# Patient Record
Sex: FEMALE | Race: BLACK OR AFRICAN AMERICAN | Employment: UNEMPLOYED | URBAN - METROPOLITAN AREA
[De-identification: names, ages, dates, MRNs, and addresses within clinical notes are randomized per-mention and may not be internally consistent; named-entity substitution may affect disease eponyms.]

---

## 2018-10-10 ENCOUNTER — APPOINTMENT (OUTPATIENT)
Dept: CT IMAGING | Age: 68
DRG: 720 | End: 2018-10-10
Attending: EMERGENCY MEDICINE
Payer: MEDICAID

## 2018-10-10 ENCOUNTER — HOSPITAL ENCOUNTER (INPATIENT)
Age: 68
LOS: 5 days | Discharge: HOME | DRG: 720 | End: 2018-10-15
Attending: EMERGENCY MEDICINE | Admitting: HOSPITALIST
Payer: MEDICAID

## 2018-10-10 ENCOUNTER — APPOINTMENT (OUTPATIENT)
Dept: GENERAL RADIOLOGY | Age: 68
DRG: 720 | End: 2018-10-10
Attending: EMERGENCY MEDICINE
Payer: MEDICAID

## 2018-10-10 DIAGNOSIS — N39.0 ACUTE UTI: Primary | ICD-10-CM

## 2018-10-10 DIAGNOSIS — E83.42 HYPOMAGNESEMIA: ICD-10-CM

## 2018-10-10 DIAGNOSIS — A41.9 SEVERE SEPSIS (HCC): ICD-10-CM

## 2018-10-10 DIAGNOSIS — E87.6 ACUTE HYPOKALEMIA: ICD-10-CM

## 2018-10-10 DIAGNOSIS — G40.909 RECURRENT SEIZURES (HCC): ICD-10-CM

## 2018-10-10 DIAGNOSIS — E83.51 HYPOCALCEMIA: ICD-10-CM

## 2018-10-10 DIAGNOSIS — R65.20 SEVERE SEPSIS (HCC): ICD-10-CM

## 2018-10-10 PROBLEM — R56.9 SEIZURES (HCC): Status: ACTIVE | Noted: 2018-10-10

## 2018-10-10 LAB
ALBUMIN SERPL-MCNC: 2.4 G/DL (ref 3.4–5)
ALBUMIN/GLOB SERPL: 0.5 {RATIO} (ref 0.8–1.7)
ALP SERPL-CCNC: 70 U/L (ref 45–117)
ALT SERPL-CCNC: 19 U/L (ref 13–56)
AMMONIA PLAS-SCNC: 55 UMOL/L (ref 11–32)
ANION GAP SERPL CALC-SCNC: 12 MMOL/L (ref 3–18)
APPEARANCE UR: CLEAR
AST SERPL-CCNC: 21 U/L (ref 15–37)
BACTERIA URNS QL MICRO: ABNORMAL /HPF
BASOPHILS # BLD: 0 K/UL (ref 0–0.1)
BASOPHILS NFR BLD: 0 % (ref 0–2)
BILIRUB SERPL-MCNC: 0.1 MG/DL (ref 0.2–1)
BILIRUB UR QL: NEGATIVE
BUN SERPL-MCNC: 9 MG/DL (ref 7–18)
BUN/CREAT SERPL: 8 (ref 12–20)
CA-I SERPL-SCNC: 0.53 MMOL/L (ref 1.12–1.32)
CALCIUM SERPL-MCNC: 5.1 MG/DL (ref 8.5–10.1)
CHLORIDE SERPL-SCNC: 107 MMOL/L (ref 100–108)
CK MB CFR SERPL CALC: 0.2 % (ref 0–4)
CK MB SERPL-MCNC: 1.2 NG/ML (ref 5–25)
CK SERPL-CCNC: 651 U/L (ref 26–192)
CO2 SERPL-SCNC: 25 MMOL/L (ref 21–32)
COLOR UR: YELLOW
CREAT SERPL-MCNC: 1.14 MG/DL (ref 0.6–1.3)
DIFFERENTIAL METHOD BLD: ABNORMAL
EOSINOPHIL # BLD: 0 K/UL (ref 0–0.4)
EOSINOPHIL NFR BLD: 0 % (ref 0–5)
EPITH CASTS URNS QL MICRO: ABNORMAL /LPF (ref 0–5)
ERYTHROCYTE [DISTWIDTH] IN BLOOD BY AUTOMATED COUNT: 15.8 % (ref 11.6–14.5)
GLOBULIN SER CALC-MCNC: 4.9 G/DL (ref 2–4)
GLUCOSE SERPL-MCNC: 118 MG/DL (ref 74–99)
GLUCOSE UR STRIP.AUTO-MCNC: NEGATIVE MG/DL
HCT VFR BLD AUTO: 31.1 % (ref 35–45)
HGB BLD-MCNC: 9.9 G/DL (ref 12–16)
HGB UR QL STRIP: ABNORMAL
KETONES UR QL STRIP.AUTO: NEGATIVE MG/DL
LACTATE BLD-SCNC: 2.3 MMOL/L (ref 0.4–2)
LACTATE BLD-SCNC: 5.5 MMOL/L (ref 0.4–2)
LEUKOCYTE ESTERASE UR QL STRIP.AUTO: ABNORMAL
LYMPHOCYTES # BLD: 1.8 K/UL (ref 0.9–3.6)
LYMPHOCYTES NFR BLD: 5 % (ref 21–52)
MAGNESIUM SERPL-MCNC: 0.6 MG/DL (ref 1.6–2.6)
MCH RBC QN AUTO: 25.6 PG (ref 24–34)
MCHC RBC AUTO-ENTMCNC: 31.8 G/DL (ref 31–37)
MCV RBC AUTO: 80.4 FL (ref 74–97)
MONOCYTES # BLD: 0.8 K/UL (ref 0.05–1.2)
MONOCYTES NFR BLD: 2 % (ref 3–10)
NEUTS SEG # BLD: 32.3 K/UL (ref 1.8–8)
NEUTS SEG NFR BLD: 93 % (ref 40–73)
NITRITE UR QL STRIP.AUTO: NEGATIVE
PH UR STRIP: 6 [PH] (ref 5–8)
PLATELET # BLD AUTO: 612 K/UL (ref 135–420)
PMV BLD AUTO: 9.4 FL (ref 9.2–11.8)
POTASSIUM SERPL-SCNC: 2.9 MMOL/L (ref 3.5–5.5)
PROT SERPL-MCNC: 7.3 G/DL (ref 6.4–8.2)
PROT UR STRIP-MCNC: ABNORMAL MG/DL
RBC # BLD AUTO: 3.87 M/UL (ref 4.2–5.3)
RBC #/AREA URNS HPF: ABNORMAL /HPF (ref 0–5)
SODIUM SERPL-SCNC: 144 MMOL/L (ref 136–145)
SP GR UR REFRACTOMETRY: 1.01 (ref 1–1.03)
TROPONIN I SERPL-MCNC: 0.07 NG/ML (ref 0–0.04)
TSH SERPL DL<=0.05 MIU/L-ACNC: 0.61 UIU/ML (ref 0.36–3.74)
UROBILINOGEN UR QL STRIP.AUTO: 0.2 EU/DL (ref 0.2–1)
WBC # BLD AUTO: 34.9 K/UL (ref 4.6–13.2)
WBC URNS QL MICRO: ABNORMAL /HPF (ref 0–4)

## 2018-10-10 PROCEDURE — 80053 COMPREHEN METABOLIC PANEL: CPT | Performed by: EMERGENCY MEDICINE

## 2018-10-10 PROCEDURE — 87184 SC STD DISK METHOD PER PLATE: CPT | Performed by: EMERGENCY MEDICINE

## 2018-10-10 PROCEDURE — 81001 URINALYSIS AUTO W/SCOPE: CPT | Performed by: EMERGENCY MEDICINE

## 2018-10-10 PROCEDURE — 84443 ASSAY THYROID STIM HORMONE: CPT | Performed by: EMERGENCY MEDICINE

## 2018-10-10 PROCEDURE — 82550 ASSAY OF CK (CPK): CPT | Performed by: EMERGENCY MEDICINE

## 2018-10-10 PROCEDURE — 96376 TX/PRO/DX INJ SAME DRUG ADON: CPT

## 2018-10-10 PROCEDURE — 85025 COMPLETE CBC W/AUTO DIFF WBC: CPT | Performed by: EMERGENCY MEDICINE

## 2018-10-10 PROCEDURE — 74011000258 HC RX REV CODE- 258: Performed by: EMERGENCY MEDICINE

## 2018-10-10 PROCEDURE — 96367 TX/PROPH/DG ADDL SEQ IV INF: CPT

## 2018-10-10 PROCEDURE — 96368 THER/DIAG CONCURRENT INF: CPT

## 2018-10-10 PROCEDURE — 74011250636 HC RX REV CODE- 250/636: Performed by: EMERGENCY MEDICINE

## 2018-10-10 PROCEDURE — 87077 CULTURE AEROBIC IDENTIFY: CPT | Performed by: EMERGENCY MEDICINE

## 2018-10-10 PROCEDURE — 65270000029 HC RM PRIVATE

## 2018-10-10 PROCEDURE — 93005 ELECTROCARDIOGRAM TRACING: CPT

## 2018-10-10 PROCEDURE — 87086 URINE CULTURE/COLONY COUNT: CPT | Performed by: EMERGENCY MEDICINE

## 2018-10-10 PROCEDURE — 96365 THER/PROPH/DIAG IV INF INIT: CPT

## 2018-10-10 PROCEDURE — 82140 ASSAY OF AMMONIA: CPT | Performed by: EMERGENCY MEDICINE

## 2018-10-10 PROCEDURE — 87040 BLOOD CULTURE FOR BACTERIA: CPT | Performed by: EMERGENCY MEDICINE

## 2018-10-10 PROCEDURE — 70450 CT HEAD/BRAIN W/O DYE: CPT

## 2018-10-10 PROCEDURE — 96375 TX/PRO/DX INJ NEW DRUG ADDON: CPT

## 2018-10-10 PROCEDURE — 87186 SC STD MICRODIL/AGAR DIL: CPT | Performed by: EMERGENCY MEDICINE

## 2018-10-10 PROCEDURE — 82330 ASSAY OF CALCIUM: CPT | Performed by: EMERGENCY MEDICINE

## 2018-10-10 PROCEDURE — 96366 THER/PROPH/DIAG IV INF ADDON: CPT

## 2018-10-10 PROCEDURE — 71045 X-RAY EXAM CHEST 1 VIEW: CPT

## 2018-10-10 PROCEDURE — 83605 ASSAY OF LACTIC ACID: CPT

## 2018-10-10 PROCEDURE — 99285 EMERGENCY DEPT VISIT HI MDM: CPT

## 2018-10-10 PROCEDURE — 83735 ASSAY OF MAGNESIUM: CPT | Performed by: EMERGENCY MEDICINE

## 2018-10-10 RX ORDER — SODIUM CHLORIDE AND POTASSIUM CHLORIDE .9; .15 G/100ML; G/100ML
SOLUTION INTRAVENOUS
Status: COMPLETED | OUTPATIENT
Start: 2018-10-10 | End: 2018-10-13

## 2018-10-10 RX ORDER — LORAZEPAM 2 MG/ML
1 INJECTION INTRAMUSCULAR
Status: COMPLETED | OUTPATIENT
Start: 2018-10-10 | End: 2018-10-10

## 2018-10-10 RX ORDER — LORAZEPAM 2 MG/ML
INJECTION INTRAMUSCULAR
Status: DISPENSED
Start: 2018-10-10 | End: 2018-10-11

## 2018-10-10 RX ORDER — LEVETIRACETAM 15 MG/ML
1500 INJECTION INTRAVASCULAR
Status: COMPLETED | OUTPATIENT
Start: 2018-10-10 | End: 2018-10-10

## 2018-10-10 RX ORDER — SODIUM CHLORIDE 0.9 % (FLUSH) 0.9 %
5-10 SYRINGE (ML) INJECTION AS NEEDED
Status: DISCONTINUED | OUTPATIENT
Start: 2018-10-10 | End: 2018-10-15 | Stop reason: HOSPADM

## 2018-10-10 RX ORDER — LEVOFLOXACIN 5 MG/ML
750 INJECTION, SOLUTION INTRAVENOUS EVERY 24 HOURS
Status: DISCONTINUED | OUTPATIENT
Start: 2018-10-10 | End: 2018-10-15 | Stop reason: HOSPADM

## 2018-10-10 RX ORDER — MAGNESIUM SULFATE HEPTAHYDRATE 40 MG/ML
2 INJECTION, SOLUTION INTRAVENOUS ONCE
Status: COMPLETED | OUTPATIENT
Start: 2018-10-10 | End: 2018-10-10

## 2018-10-10 RX ORDER — SODIUM CHLORIDE 9 MG/ML
150 INJECTION, SOLUTION INTRAVENOUS CONTINUOUS
Status: DISCONTINUED | OUTPATIENT
Start: 2018-10-10 | End: 2018-10-11

## 2018-10-10 RX ADMIN — LORAZEPAM 1 MG: 2 INJECTION, SOLUTION INTRAMUSCULAR; INTRAVENOUS at 21:20

## 2018-10-10 RX ADMIN — LEVOFLOXACIN 750 MG: 5 INJECTION, SOLUTION INTRAVENOUS at 19:53

## 2018-10-10 RX ADMIN — SODIUM CHLORIDE AND POTASSIUM CHLORIDE: 9; 1.49 INJECTION, SOLUTION INTRAVENOUS at 23:43

## 2018-10-10 RX ADMIN — LEVETIRACETAM 1500 MG: 15 INJECTION INTRAVENOUS at 19:44

## 2018-10-10 RX ADMIN — SODIUM CHLORIDE 1000 ML: 900 INJECTION, SOLUTION INTRAVENOUS at 19:44

## 2018-10-10 RX ADMIN — SODIUM CHLORIDE 1000 MG: 900 INJECTION, SOLUTION INTRAVENOUS at 20:01

## 2018-10-10 RX ADMIN — SODIUM CHLORIDE 1000 ML: 900 INJECTION, SOLUTION INTRAVENOUS at 20:38

## 2018-10-10 RX ADMIN — PIPERACILLIN SODIUM,TAZOBACTAM SODIUM 4.5 G: 4; .5 INJECTION, POWDER, FOR SOLUTION INTRAVENOUS at 20:05

## 2018-10-10 RX ADMIN — MAGNESIUM SULFATE HEPTAHYDRATE 2 G: 40 INJECTION, SOLUTION INTRAVENOUS at 21:55

## 2018-10-10 RX ADMIN — LORAZEPAM 1 MG: 2 INJECTION, SOLUTION INTRAMUSCULAR; INTRAVENOUS at 21:31

## 2018-10-10 RX ADMIN — CALCIUM GLUCONATE 2000 MG: 94 INJECTION, SOLUTION INTRAVENOUS at 23:48

## 2018-10-10 RX ADMIN — SODIUM CHLORIDE 150 ML/HR: 900 INJECTION, SOLUTION INTRAVENOUS at 19:35

## 2018-10-10 RX ADMIN — SODIUM CHLORIDE AND POTASSIUM CHLORIDE: 9; 1.49 INJECTION, SOLUTION INTRAVENOUS at 23:47

## 2018-10-10 NOTE — IP AVS SNAPSHOT
[address] [address] 
[phone] Patient: Nelly Garcia MRN: [id number] MUE:[id number]/81/[id number] About your hospitalization You were admitted on:  October 10, 2018 You last received care in the:  [address] [place] [address] You were discharged on:  October 15, 2018 Why you were hospitalized Your primary diagnosis was:  Sepsis (Hcc) Your diagnoses also included:  Seizures (Hcc), Uti (Urinary Tract Infection), Type Ii Diabetes Mellitus (Hcc), Cad (Coronary Artery Disease), Cerebrovascular Disease, Htn (Hypertension) Follow-up Information Follow up With Details Comments Contact Info Mary Ashraf MD Schedule an appointment as soon as possible for a visit in 1 week  [address] [place] [address] [id number] [phone] Discharge Orders None A check daniel indicates which time of day the medication should be taken. My Medications START taking these medications Instructions Each Dose to Equal  
 Morning Noon Evening Bedtime  
 levoFLOXacin 500 mg tablet Commonly known as:  Mujica Colonel Your last dose was: Your next dose is: Take 1 Tab by mouth daily for 7 days. 500 mg CONTINUE taking these medications Instructions Each Dose to Equal  
 Morning Noon Evening Bedtime  
 aspirin 81 mg tablet Your last dose was: Your next dose is: Take 81 mg by mouth daily. 81 mg CALCIUM 600 + D 600-125 mg-unit Tab Generic drug:  calcium-cholecalciferol (d3) Your last dose was: Your next dose is: Take  by mouth three (3) times daily. cloNIDine HCl 0.1 mg tablet Commonly known as:  CATAPRES Your last dose was: Your next dose is: Take 1 Tab by mouth two (2) times a day.   
 0.1 mg  
    
   
   
   
  
 clotrimazole 1 % topical cream  
Commonly known as:  Edwin Edgar Your last dose was: Your next dose is:    
   
   
 Apply  to affected area two (2) times a day. enalapril 2.5 mg tablet Commonly known as:  Flavia Fermin Your last dose was: Your next dose is: Take 2.5 mg by mouth daily. 2.5 mg  
    
   
   
   
  
 EUCERIN lotion Generic drug:  mineral oil-isopropyl myristat Your last dose was: Your next dose is:    
   
   
 Apply  to affected area as needed. folic acid 1 mg tablet Commonly known as:  Google Your last dose was: Your next dose is: Take 1 Tab by mouth daily. 1 mg  
    
   
   
   
  
 gabapentin 250 mg/5 mL solution Commonly known as:  NEURONTIN Your last dose was: Your next dose is: Take 5 mL by mouth three (3) times daily. 250 mg  
    
   
   
   
  
 GLUCOTROL 5 mg tablet Generic drug:  glipiZIDE Your last dose was: Your next dose is: Take 5 mg by mouth two (2) times a day. 5 mg  
    
   
   
   
  
 insulin detemir U-100 100 unit/mL injection Commonly known as:  LEVEMIR Your last dose was: Your next dose is:    
   
   
 0. 27 mL by SubCUTAneous route nightly. 27 Units LANTUS U-100 INSULIN 100 unit/mL injection Generic drug:  insulin glargine Your last dose was: Your next dose is:    
   
   
 25 Units by SubCUTAneous route nightly. 25 Units  
    
   
   
   
  
 levETIRAcetam 750 mg tablet Commonly known as:  KEPPRA Your last dose was: Your next dose is: Take 1 Tab by mouth two (2) times a day. 750 mg  
    
   
   
   
  
 lisinopril 10 mg tablet Commonly known as:  Seretha Siad Your last dose was: Your next dose is: Take 1 Tab by mouth daily.   
 10 mg  
    
   
   
   
 loperamide 2 mg capsule Commonly known as:  IMODIUM Your last dose was: Your next dose is: Take 2 mg by mouth four (4) times daily as needed. 2 mg LORazepam 0.5 mg tablet Commonly known as:  ATIVAN Your last dose was: Your next dose is: Take 0.5 Tabs by mouth every six (6) hours as needed. 0.25 mg  
    
   
   
   
  
 metFORMIN 1,000 mg tablet Commonly known as:  GLUCOPHAGE Your last dose was: Your next dose is: Take 1 Tab by mouth two (2) times a day. 1000 mg  
    
   
   
   
  
 mirtazapine 15 mg tablet Commonly known as:  Skippy Genin Your last dose was: Your next dose is: Take 1 Tab by mouth nightly. 15 mg  
    
   
   
   
  
 simvastatin 40 mg tablet Commonly known as:  ZOCOR Your last dose was: Your next dose is: Take 1 Tab by mouth nightly. 40 mg  
    
   
   
   
  
 thiamine  mg tablet Commonly known as:  B-1 Your last dose was: Your next dose is: Take  by mouth daily. Where to Get Your Medications Information on where to get these meds will be given to you by the nurse or doctor. ! Ask your nurse or doctor about these medications  
  levoFLOXacin 500 mg tablet Discharge Instructions Patient armband removed and shredded DISCHARGE SUMMARY from Nurse PATIENT INSTRUCTIONS: 
 
 
F-face looks uneven A-arms unable to move or move unevenly S-speech slurred or non-existent T-time-call 911 as soon as signs and symptoms begin-DO NOT go  
 Back to bed or wait to see if you get better-TIME IS BRAIN. Warning Signs of HEART ATTACK Call 911 if you have these symptoms: 
? Chest discomfort. Most heart attacks involve discomfort in the center of the chest that lasts more than a few minutes, or that goes away and comes back. It can feel like uncomfortable pressure, squeezing, fullness, or pain. ? Discomfort in other areas of the upper body. Symptoms can include pain or discomfort in one or both arms, the back, neck, jaw, or stomach. ? Shortness of breath with or without chest discomfort. ? Other signs may include breaking out in a cold sweat, nausea, or lightheadedness. Don't wait more than five minutes to call [address]! Fast action can save your life. Calling 911 is almost always the fastest way to get lifesaving treatment. Emergency Medical Services staff can begin treatment when they arrive  up to an hour sooner than if someone gets to the hospital by car. The discharge information has been reviewed with the patient. The patient verbalized understanding. Discharge medications reviewed with the patient and appropriate educational materials and side effects teaching were provided. ___________________________________________________________________________________________________________________________________ [employer] Announcement We are excited to announce that we are making your provider's discharge notes available to you in [employer]. You will see these notes when they are completed and signed by the physician that discharged you from your recent hospital stay. If you have any questions or concerns about any information you see in [employer], please call the Health Information Department where you were seen or reach out to your Primary Care Provider for more information about your plan of care. Introducing [facility] & [facility] SERVICES!    
 Christi Van introduces [employer] patient portal. Now you can access parts of your medical record, email your doctor's office, and request medication refills online. 1. In your internet browser, go to [email or web address] 2. Click on the First Time User? Click Here link in the Sign In box. You will see the New Member Sign Up page. 3. Enter your Zafin Access Code exactly as it appears below. You will not need to use this code after youve completed the sign-up process. If you do not sign up before the expiration date, you must request a new code. · [employer] Access Code: [email or web address] Expires: 1/8/2019  6:55 PM 
 
4. Enter the last four digits of your Social Security Number (xxxx) and Date of Birth (mm/dd/yyyy) as indicated and click Submit. You will be taken to the next sign-up page. 5. Create a Zafin ID. This will be your Zafin login ID and cannot be changed, so think of one that is secure and easy to remember. 6. Create a Zafin password. You can change your password at any time. 7. Enter your Password Reset Question and Answer. This can be used at a later time if you forget your password. 8. Enter your e-mail address. You will receive e-mail notification when new information is available in [address] 9. Click Sign Up. You can now view and download portions of your medical record. 10. Click the Download Summary menu link to download a portable copy of your medical information. If you have questions, please visit the Frequently Asked Questions section of the [employer] website. Remember, Z[employer]in is NOT to be used for urgent needs. For medical emergencies, dial 911. Now available from your iPhone and Android! Introducing [facility] As a Neal Carpenter patient, I wanted to make you aware of our electronic visit tool called [facility]. [facility] 24/7 allows you to connect within minutes with a medical provider 24 hours a day, seven days a week via a mobile device or tablet or logging into a secure website from your computer. You can access [employer] from anywhere in the [place]. A virtual visit might be right for you when you have a simple condition and feel like you just dont want to get out of bed, or cant get away from work for an appointment, when your regular [employer] provider is not available (evenings, weekends or holidays), or when youre out of town and need minor care. Electronic visits cost only $49 and if the [employer] 24/7 provider determines a prescription is needed to treat your condition, one can be electronically transmitted to a nearby pharmacy*. Please take a moment to enroll today if you have not already done so. The enrollment process is free and takes just a few minutes. To enroll, please download the [employer] 24/7 carmina to your tablet or phone, or visit [email or web address] to enroll on your computer. And, as an [address] [place] [address] patient with a [employer] account, the results of your visits will be scanned into your electronic medical record and your primary care provider will be able to view the scanned results. We urge you to continue to see your regular [employer] provider for your ongoing medical care. And while your primary care provider may not be the one available when you seek a [employer] virtual visit, the peace of mind you get from getting a real diagnosis real time can be priceless. For more information on [employer], view our Frequently Asked Questions (FAQs) at [email or web address]. Sincerely, 
 
Clarita Berger MD 
Chief Medical Officer [place] Ailyn Maritnez *:  certain medications cannot be prescribed via [employer] Unresulted Labs-Please follow up with your PCP about these lab tests Order Current Status CULTURE, BLOOD Preliminary result Providers Seen During Your Hospitalization Provider Specialty Primary office phone Faisal Baxter MD Emergency Medicine [phone] Mark Fernandez MD Family Practice [phone] Soren Peralta MD Internal Medicine [phone] Tiffany Souza DO Internal Medicine [phone] Immunizations Administered for This Admission Name Date Influenza Vaccine (Quad) PF 10/15/2018 Your Primary Care Physician (PCP) Primary Care Physician Office Phone Office Fax Ama Taylor [phone] You are allergic to the following No active allergies Recent Documentation Height Weight BMI OB Status Smoking Status 1.6 m 59 kg 23.03 kg/m2 Postmenopausal Current Every Day Smoker Emergency Contacts Name Discharge Info Relation Home Work Mobile Taiwo Christina DISCHARGE CAREGIVER [3] Child [2] [phone] Patient Belongings The following personal items are in your possession at time of discharge: 
                             
 
  
  
Discharge Instructions Attachments/References MEFS - LEVOFLOXACIN (LEVAQUIN, LEVAQUIN LEVA-JIM) - (BY MOUTH) (ENGLISH) SEIZURE (ENGLISH) UTI (URINARY TRACT INFECTION): FEMALE (ENGLISH) Patient Handouts Levofloxacin (Levaquin, Levaquin Leva-jim) - (By mouth) Why this medicine is used:  
Treats infections. Contact a nurse or doctor right away if you have: · Blistering, peeling, red skin rash · Fast, slow, or uneven heartbeat; lightheadedness or fainting · Dark urine or pale stools, loss of appetite, stomach pain, yellow skin or eyes · Severe or bloody diarrhea · Pain, stiffness, swelling, or bruises around your ankle, leg, shoulder, or other joint Common side effects: · Mild nausea, vomiting, diarrhea · Mild headache © 2017 [facility] Information is for End User's use only and may not be sold, redistributed or otherwise used for commercial purposes. Seizure: Care Instructions Your Care Instructions Seizures are caused by abnormal patterns of electrical signals in the brain. They are different for each person. Seizures can affect movement, speech, vision, or awareness. Some people have only slight shaking of a hand and do not pass out. Other people may pass out and have violent shaking of the whole body. Some people appear to stare into space. They are awake, but they can't respond normally. Later, they may not remember what happened. You may need tests to identify the type and cause of the seizures. A seizure may occur only once, or you may have them more than one time. Taking medicines as directed and following up with your doctor may help keep you from having more seizures. The doctor has checked you carefully, but problems can develop later. If you notice any problems or new symptoms, get medical treatment right away. Follow-up care is a key part of your treatment and safety. Be sure to make and go to all appointments, and call your doctor if you are having problems. It's also a good idea to know your test results and keep a list of the medicines you take. How can you care for yourself at home? · Be safe with medicines. Take your medicines exactly as prescribed. Call your doctor if you think you are having a problem with your medicine. · Do not do any activity that could be dangerous to you or others until your doctor says it is safe to do so. For example, do not drive a car, operate machinery, swim, or climb ladders. · Be sure that anyone treating you for any health problem knows that you have had a seizure and what medicines you are taking for it. · Identify and avoid things that may make you more likely to have a seizure. These may include lack of sleep, alcohol or drug use, stress, or not eating. · Make sure you go to your follow-up appointment. When should you call for help? Call 911 anytime you think you may need emergency care. For example, call if: 
  · You have another seizure.   · You have more than one seizure in 24 hours.  
  · You have new symptoms, such as trouble walking, speaking, or thinking clearly.  
 Call your doctor now or seek immediate medical care if: 
  · You are not acting normally.  
 Watch closely for changes in your health, and be sure to contact your doctor if you have any problems. Where can you learn more? Go to http://jordyn-ender.info/. Enter U980 in the search box to learn more about \"Seizure: Care Instructions. \" Current as of: June 4, 2018 Content Version: 11.8 © 9608-6702 [employer]. Care instructions adapted under license by [employer] (which disclaims liability or warranty for this information). If you have questions about a medical condition or this instruction, always ask your healthcare professional. [address] any warranty or liability for your use of this information. Urinary Tract Infection in Women: Care Instructions Your Care Instructions A urinary tract infection, or UTI, is a general term for an infection anywhere between the kidneys and the urethra (where urine comes out). Most UTIs are bladder infections. They often cause pain or burning when you urinate. UTIs are caused by bacteria and can be cured with antibiotics. Be sure to complete your treatment so that the infection goes away. Follow-up care is a key part of your treatment and safety. Be sure to make and go to all appointments, and call your doctor if you are having problems. It's also a good idea to know your test results and keep a list of the medicines you take. How can you care for yourself at home? · Take your antibiotics as directed. Do not stop taking them just because you feel better. You need to take the full course of antibiotics. · Drink extra water and other fluids for the next day or two. This may help wash out the bacteria that are causing the infection.  (If you have kidney, heart, or liver disease and have to limit fluids, talk with your doctor before you increase your fluid intake.) · Avoid drinks that are carbonated or have caffeine. They can irritate the bladder. · Urinate often. Try to empty your bladder each time. · To relieve pain, take a hot bath or lay a heating pad set on low over your lower belly or genital area. Never go to sleep with a heating pad in place. To prevent UTIs · Drink plenty of water each day. This helps you urinate often, which clears bacteria from your system. (If you have kidney, heart, or liver disease and have to limit fluids, talk with your doctor before you increase your fluid intake.) · Urinate when you need to. · Urinate right after you have sex. · Change sanitary pads often. · Avoid douches, bubble baths, feminine hygiene sprays, and other feminine hygiene products that have deodorants. · After going to the bathroom, wipe from front to back. When should you call for help? Call your doctor now or seek immediate medical care if: 
  · Symptoms such as fever, chills, nausea, or vomiting get worse or appear for the first time.  
  · You have new pain in your back just below your rib cage. This is called flank pain.  
  · There is new blood or pus in your urine.  
  · You have any problems with your antibiotic medicine.  
 Watch closely for changes in your health, and be sure to contact your doctor if: 
  · You are not getting better after taking an antibiotic for 2 days.  
  · Your symptoms go away but then come back. Where can you learn more? Go to http://jordyn-ender.info/. Enter W028 in the search box to learn more about \"Urinary Tract Infection in Women: Care Instructions. \" Current as of: March 21, 2018 Content Version: 11.8 © 0124-9024 [employer].  Care instructions adapted under license by [employer] (which disclaims liability or warranty for this information). If you have questions about a medical condition or this instruction, always ask your healthcare professional. [address] any warranty or liability for your use of this information. Please provide this summary of care documentation to your next provider. Signatures-by signing, you are acknowledging that this After Visit Summary has been reviewed with you and you have received a copy. Patient Signature:  ____________________________________________________________ Date:  ____________________________________________________________  
  
[facility] Provider Signature:  ____________________________________________________________ Date:  ____________________________________________________________

## 2018-10-10 NOTE — ED TRIAGE NOTES
Per EMS-PATIENT WARM TO TOUCH. fAMILY MEMBER STATES PATIENT HAD A SEIZURE TODAY AND YESTERDAY BUT DOES HAVE HISTORY

## 2018-10-10 NOTE — LETTER
NOTIFICATION RETURN TO WORK / SCHOOL 
 
10/10/2018 10:51 PM 
 
Ms. Marlena Hung [address] [id number] To Whom It May Concern: 
 
Marlena Hung is currently under the care of [address] [place] [address] EMERGENCY DEPT. She will return to work/school on: 10/15/18 If there are questions or concerns please have the patient contact our office. Sincerely, Jaz Galindo MD

## 2018-10-10 NOTE — ED PROVIDER NOTES
HPI Comments: Derrick Pierce is a 80 y.o. Female with h/o cva, seizures who was noted by caregiver for recurrent seizure today and 2 yesterday. Ems suspected pt was febrile. No witnessed seizure activity per ems. Pt not able to give history due to underlying condition. The history is provided by the EMS personnel and medical records. The history is limited by the condition of the patient. Past Medical History:  
Diagnosis Date  Aphasia  CAD (coronary artery disease)  Diabetes ([address]  Dysarthria  Hypertension  Seizures ([facility] [facility]  Stroke ([facility] [address] Past Surgical History:  
Procedure Laterality Date  HX APPENDECTOMY  HX HERNIA REPAIR    
 HX MYOMECTOMY Family History:  
Problem Relation Age of Onset  Diabetes Other  Hypertension Other Social History Social History  Marital status: SINGLE Spouse name: N/A  
 Number of children: N/A  
 Years of education: N/A Occupational History  Not on file. Social History Main Topics  Smoking status: Current Every Day Smoker Packs/day: 0.50  Smokeless tobacco: Not on file  Alcohol use 15.0 oz/week 30 Cans of beer per week  Drug use: No  
 Sexual activity: Not on file Other Topics Concern  Not on file Social History Narrative ALLERGIES: Review of patient's allergies indicates no known allergies. Review of Systems Unable to perform ROS: Mental status change Vitals:  
 10/10/18 2015 10/10/18 2200 10/10/18 2215 10/10/18 2230 BP: 148/87 159/85 (!) 156/91 148/76 Pulse: 91 81 76 79 Resp: 18 26 24 23 Temp:      
SpO2:  97% 98% 98% Weight:      
Height:      
      
 
Physical Exam  
Constitutional: She appears well-developed and well-nourished. No distress. HENT:  
Head: Normocephalic and atraumatic.   
Right Ear: External ear normal.  
Left Ear: External ear normal.  
Nose: Nose normal.  
 Mouth/Throat: Uvula is midline, oropharynx is clear and moist and mucous membranes are normal.  
Eyes: Conjunctivae and EOM are normal. Pupils are equal, round, and reactive to light. No scleral icterus. Neck: Normal range of motion. Neck supple. Cardiovascular: Normal rate, regular rhythm, normal heart sounds and intact distal pulses. Pulmonary/Chest: Effort normal and breath sounds normal.  
Abdominal: Soft. There is no tenderness. Musculoskeletal: She exhibits no edema. Neurological: She is alert. Gait normal.  
Eyes open moves all ext, nonverbal. No tremor, seizure like activity. Responds to pain Skin: Skin is warm and dry. She is not diaphoretic. Psychiatric: Her behavior is normal.  
Nursing note and vitals reviewed. [facility] 
 
 
ED Course Procedures Vitals: 
Patient Vitals for the past 12 hrs: 
 Temp Pulse Resp BP SpO2  
10/10/18 2230 - 79 23 148/76 98 % 10/10/18 2215 - 76 24 (!) 156/91 98 % 10/10/18 2200 - 81 26 159/85 97 % 10/10/18 2015 - 91 18 148/87 -  
10/10/18 1928 - 85 18 162/81 100 % 10/10/18 1927 100.2 °F (37.9 °C) - - - - Medications ordered:  
Medications  
sodium chloride (NS) flush 5-10 mL (not administered) 0.9% sodium chloride infusion (150 mL/hr IntraVENous New Bag 10/10/18 1935) piperacillin-tazobactam (ZOSYN) 4.5 g in 0.9% sodium chloride (MBP/ADV) 100 mL MBP (0 g IntraVENous IV Completed 10/10/18 2118) levoFLOXacin (LEVAQUIN) 750 mg in D5W IVPB (0 mg IntraVENous IV Completed 10/10/18 2123) Please enter HEIGHT for renal function. thanks. (not administered) 0.9% sodium chloride with KCl 20 mEq/L infusion (not administered)  
calcium gluconate 2 gram in 0.9% sodium chloride 50 ml infusion (not administered) LORazepam (ATIVAN) 2 mg/mL injection (0 mg  Held 10/10/18 2119)  
vancomycin (VANCOCIN) 1,000 mg in 0.9% sodium chloride (MBP/ADV) 250 mL adv (not administered) VANCOMYCIN INFORMATION NOTE (not administered) VANCOMYCIN INFORMATION NOTE 1 Each (not administered)  
sodium chloride 0.9 % bolus infusion 1,000 mL (1,000 mL IntraVENous New Bag 10/10/18 1944) levETIRAcetam (KEPPRA) 1500 mg in saline (iso-osm) 100 ml IVPB (0 mg IntraVENous IV Completed 10/10/18 1959)  
sodium chloride 0.9 % bolus infusion 1,000 mL (1,000 mL IntraVENous New Bag 10/10/18 2038) vancomycin (VANCOCIN) 1,000 mg in 0.9% sodium chloride (MBP/ADV) 250 mL adv (0 mg IntraVENous IV Completed 10/10/18 2201) LORazepam (ATIVAN) injection 1 mg (1 mg IntraVENous Given 10/10/18 2120) LORazepam (ATIVAN) injection 1 mg (1 mg IntraVENous Given 10/10/18 2131)  
magnesium sulfate 2 g/50 ml IVPB (premix or compounded) (2 g IntraVENous New Bag 10/10/18 2155)  
magnesium sulfate 2 g/50 ml IVPB (premix or compounded) (2 g IntraVENous New Bag 10/10/18 2155) Lab findings: 
Recent Results (from the past 12 hour(s)) URINALYSIS W/ RFLX MICROSCOPIC Collection Time: 10/10/18  4:10 PM  
Result Value Ref Range Color YELLOW Appearance CLEAR Specific gravity 1.007 1.005 - 1.030    
 pH (UA) 6.0 5.0 - 8.0 Protein TRACE (A) NEG mg/dL Glucose NEGATIVE  NEG mg/dL Ketone NEGATIVE  NEG mg/dL Bilirubin NEGATIVE  NEG Blood SMALL (A) NEG Urobilinogen 0.2 0.2 - 1.0 EU/dL Nitrites NEGATIVE  NEG Leukocyte Esterase LARGE (A) NEG URINE MICROSCOPIC ONLY Collection Time: 10/10/18  4:10 PM  
Result Value Ref Range WBC TOO NUMEROUS TO COUNT 0 - 4 /hpf  
 RBC 0 to 2 0 - 5 /hpf Epithelial cells FEW 0 - 5 /lpf Bacteria 3+ (A) NEG /hpf  
AMMONIA Collection Time: 10/10/18  7:15 PM  
Result Value Ref Range Ammonia 55 (H) 11 - 32 UMOL/L  
POC LACTIC ACID Collection Time: 10/10/18  7:25 PM  
Result Value Ref Range Lactic Acid (POC) 5.5 (HH) 0.4 - 2.0 mmol/L METABOLIC PANEL, COMPREHENSIVE Collection Time: 10/10/18  7:40 PM  
Result Value Ref Range  Sodium 144 136 - 145 mmol/L  
 Potassium 2.9 (LL) 3.5 - 5.5 mmol/L Chloride 107 100 - 108 mmol/L  
 CO2 25 21 - 32 mmol/L Anion gap 12 3.0 - 18 mmol/L Glucose 118 (H) 74 - 99 mg/dL BUN 9 7.0 - 18 MG/DL Creatinine 1.14 0.6 - 1.3 MG/DL  
 BUN/Creatinine ratio 8 (L) 12 - 20 GFR est AA 58 (L) >60 ml/min/1.73m2 GFR est non-AA 48 (L) >60 ml/min/1.73m2 Calcium 5.1 (LL) 8.5 - 10.1 MG/DL Bilirubin, total 0.1 (L) 0.2 - 1.0 MG/DL  
 ALT (SGPT) 19 13 - 56 U/L  
 AST (SGOT) 21 15 - 37 U/L Alk. phosphatase 70 45 - 117 U/L Protein, total 7.3 6.4 - 8.2 g/dL Albumin 2.4 (L) 3.4 - 5.0 g/dL Globulin 4.9 (H) 2.0 - 4.0 g/dL A-G Ratio 0.5 (L) 0.8 - 1.7    
CBC WITH AUTOMATED DIFF Collection Time: 10/10/18  7:40 PM  
Result Value Ref Range WBC 34.9 (H) 4.6 - 13.2 K/uL  
 RBC 3.87 (L) 4.20 - 5.30 M/uL HGB 9.9 (L) 12.0 - 16.0 g/dL HCT 31.1 (L) 35.0 - 45.0 % MCV 80.4 74.0 - 97.0 FL  
 MCH 25.6 24.0 - 34.0 PG  
 MCHC 31.8 31.0 - 37.0 g/dL  
 RDW 15.8 (H) 11.6 - 14.5 % PLATELET 725 (H) 844 - 420 K/uL MPV 9.4 9.2 - 11.8 FL  
 NEUTROPHILS 93 (H) 40 - 73 % LYMPHOCYTES 5 (L) 21 - 52 % MONOCYTES 2 (L) 3 - 10 % EOSINOPHILS 0 0 - 5 % BASOPHILS 0 0 - 2 %  
 ABS. NEUTROPHILS 32.3 (H) 1.8 - 8.0 K/UL  
 ABS. LYMPHOCYTES 1.8 0.9 - 3.6 K/UL  
 ABS. MONOCYTES 0.8 0.05 - 1.2 K/UL  
 ABS. EOSINOPHILS 0.0 0.0 - 0.4 K/UL  
 ABS. BASOPHILS 0.0 0.0 - 0.1 K/UL  
 DF AUTOMATED CARDIAC PANEL,(CK, CKMB & TROPONIN) Collection Time: 10/10/18  7:40 PM  
Result Value Ref Range  (H) 26 - 192 U/L  
 CK - MB 1.2 <3.6 ng/ml CK-MB Index 0.2 0.0 - 4.0 % Troponin-I, Qt. 0.07 (H) 0.0 - 0.045 NG/ML  
TSH 3RD GENERATION Collection Time: 10/10/18  7:40 PM  
Result Value Ref Range TSH 0.61 0.36 - 3.74 uIU/mL MAGNESIUM Collection Time: 10/10/18  7:40 PM  
Result Value Ref Range Magnesium 0.6 (LL) 1.6 - 2.6 mg/dL EKG, 12 LEAD, INITIAL Collection Time: 10/10/18  8:28 PM  
Result Value Ref Range Ventricular Rate 93 BPM  
 Atrial Rate 93 BPM  
 P-R Interval 146 ms  
 QRS Duration 68 ms Q-T Interval 444 ms QTC Calculation (Bezet) 552 ms Calculated P Axis 46 degrees Calculated R Axis 4 degrees Calculated T Axis 40 degrees Diagnosis Normal sinus rhythm Septal infarct (cited on or before 06-JAN-2011) Prolonged QT Abnormal ECG When compared with ECG of 08-SEP-2013 13:36, Non-specific change in ST segment in Inferior leads Nonspecific T wave abnormality now evident in Inferior leads QT has lengthened POC LACTIC ACID Collection Time: 10/10/18  9:28 PM  
Result Value Ref Range Lactic Acid (POC) 2.3 (HH) 0.4 - 2.0 mmol/L  
CALCIUM, IONIZED Collection Time: 10/10/18  9:30 PM  
Result Value Ref Range Ionized Calcium 0.53 (LL) 1.12 - 1.32 MMOL/L  
 
 
EKG interpretation by ED Physician: 
nsr with lvh. No acute st tw changes Rate 93, pr 146, qtc 552 Prolonged qt c/w previous Cardiac monitor: nl rate, reg rhythm, no ectopy Pulse ox: 98% ra X-Ray, CT or other radiology findings or impressions: 
XR CHEST PORT    (Results Pending) CT HEAD WO CONT    (Results Pending)  
cxr with nap per my interp Ct head: old mca cva Progress notes, Consult notes or additional Procedure notes:  
Sepsis sec to uti. Mult electrolyte abnl D/w family results need for admission D/w dr Jey Duncan on call for hospitalist who will admit ED Critical Care Note System at risk for life threatening failure: neuro, cardiac, renal, pulm Associated problems: electrolytes, infection, elevated ammonia Critical Care services provided: electrolyte replacement, abx selection, fluid management, imaging interp, consult, documentation, discussion at bedside, Excluded procedures (time not included in critical care): ecg interp Total Critical Care Time (in minutes) 94 Sepsis Re-Assessment Documentation:  
 
Date: 10/10/2018 Time: 11:24 PM 
 
 
Vital Signs Level of Consciousness: Alert Temp: 100.2 °F (37.9 °C) Temp Source: Rectal 
Pulse (Heart Rate): 79 Resp Rate: 23 BP: 148/76 MAP (Monitor): 93 MAP (Calculated): 108 BP 1 Location: Right arm BP 1 Method: Automatic 
BP Patient Position: At rest 
Dec lactate. Skin perfuson nl. bp stable Reevaluation of patient:  
stable Disposition: 
Diagnosis: 1. Acute UTI 2. Severe sepsis ([facility] [address] 3. Recurrent seizures ([facility] [address] 4. Acute hypokalemia 5. Hypomagnesemia 6. Hypocalcemia Disposition: admit Follow-up Information None Patient's Medications Start Taking No medications on file Continue Taking ASPIRIN 81 MG TABLET    Take 81 mg by mouth daily. CALCIUM-CHOLECALCIFEROL, D3, (CALCIUM 600 + D) 600-125 MG-UNIT TAB    Take  by mouth three (3) times daily. CLONIDINE (CATAPRES) 0.1 MG TABLET    Take 1 Tab by mouth two (2) times a day. CLOTRIMAZOLE (LOTRIMIN) 1 % TOPICAL CREAM    Apply  to affected area two (2) times a day. ENALAPRIL (VASOTEC) 2.5 MG TABLET    Take 2.5 mg by mouth daily. FOLIC ACID (FOLVITE) 1 MG TABLET    Take 1 Tab by mouth daily. GABAPENTIN (NEURONTIN) 250 MG/5 ML SOLUTION    Take 5 mL by mouth three (3) times daily. GLIPIZIDE (GLUCOTROL) 5 MG TABLET    Take 5 mg by mouth two (2) times a day. INSULIN DETEMIR (LEVEMIR) 100 UNIT/ML INJECTION    0.27 mL by SubCUTAneous route nightly. INSULIN GLARGINE (LANTUS) 100 UNIT/ML INJECTION    25 Units by SubCUTAneous route nightly. LEVETIRACETAM (KEPPRA) 750 MG TABLET    Take 1 Tab by mouth two (2) times a day. LISINOPRIL (PRINIVIL, ZESTRIL) 10 MG TABLET    Take 1 Tab by mouth daily. LOPERAMIDE (IMODIUM) 2 MG CAPSULE    Take 2 mg by mouth four (4) times daily as needed. LORAZEPAM (ATIVAN) 0.5 MG TABLET    Take 0.5 Tabs by mouth every six (6) hours as needed. METFORMIN (GLUCOPHAGE) 1,000 MG TABLET    Take 1 Tab by mouth two (2) times a day. MINERAL OIL-ISOPROPYL MYRISTAT (EUCERIN) LOTION    Apply  to affected area as needed. MIRTAZAPINE (REMERON) 15 MG TABLET    Take 1 Tab by mouth nightly. SIMVASTATIN (ZOCOR) 40 MG TABLET    Take 1 Tab by mouth nightly. THIAMINE (B-1) 100 MG TABLET    Take  by mouth daily. These Medications have changed No medications on file Stop Taking No medications on file

## 2018-10-11 PROBLEM — I10 HTN (HYPERTENSION): Status: ACTIVE | Noted: 2018-10-11

## 2018-10-11 PROBLEM — I25.10 CAD (CORONARY ARTERY DISEASE): Status: ACTIVE | Noted: 2018-10-11

## 2018-10-11 PROBLEM — I67.9 CEREBROVASCULAR DISEASE: Status: ACTIVE | Noted: 2018-10-11

## 2018-10-11 PROBLEM — E11.9 TYPE II DIABETES MELLITUS (HCC): Status: ACTIVE | Noted: 2018-10-11

## 2018-10-11 LAB
ANION GAP SERPL CALC-SCNC: 13 MMOL/L (ref 3–18)
BASOPHILS # BLD: 0 K/UL (ref 0–0.1)
BASOPHILS NFR BLD: 0 % (ref 0–3)
BUN SERPL-MCNC: 6 MG/DL (ref 7–18)
BUN/CREAT SERPL: 8 (ref 12–20)
CALCIUM SERPL-MCNC: 5.7 MG/DL (ref 8.5–10.1)
CHLORIDE SERPL-SCNC: 117 MMOL/L (ref 100–108)
CO2 SERPL-SCNC: 23 MMOL/L (ref 21–32)
CREAT SERPL-MCNC: 0.77 MG/DL (ref 0.6–1.3)
DIFFERENTIAL METHOD BLD: ABNORMAL
EOSINOPHIL # BLD: 0 K/UL (ref 0–0.4)
EOSINOPHIL NFR BLD: 0 % (ref 0–5)
ERYTHROCYTE [DISTWIDTH] IN BLOOD BY AUTOMATED COUNT: 15.6 % (ref 11.6–14.5)
GLUCOSE BLD STRIP.AUTO-MCNC: 108 MG/DL (ref 70–110)
GLUCOSE BLD STRIP.AUTO-MCNC: 122 MG/DL (ref 70–110)
GLUCOSE BLD STRIP.AUTO-MCNC: 132 MG/DL (ref 70–110)
GLUCOSE SERPL-MCNC: 95 MG/DL (ref 74–99)
HCT VFR BLD AUTO: 33.3 % (ref 35–45)
HGB BLD-MCNC: 10.5 G/DL (ref 12–16)
LACTATE BLD-SCNC: 2.9 MMOL/L (ref 0.4–2)
LYMPHOCYTES # BLD: 1.4 K/UL (ref 0.8–3.5)
LYMPHOCYTES NFR BLD: 7 % (ref 20–51)
MCH RBC QN AUTO: 25.4 PG (ref 24–34)
MCHC RBC AUTO-ENTMCNC: 31.5 G/DL (ref 31–37)
MCV RBC AUTO: 80.4 FL (ref 74–97)
MONOCYTES # BLD: 0.4 K/UL (ref 0–1)
MONOCYTES NFR BLD: 2 % (ref 2–9)
NEUTS BAND NFR BLD MANUAL: 2 % (ref 0–5)
NEUTS SEG # BLD: 17.6 K/UL (ref 1.8–8)
NEUTS SEG NFR BLD: 89 % (ref 42–75)
PLATELET # BLD AUTO: 618 K/UL (ref 135–420)
PLATELET COMMENTS,PCOM: ABNORMAL
PMV BLD AUTO: 9.8 FL (ref 9.2–11.8)
POTASSIUM SERPL-SCNC: 3.6 MMOL/L (ref 3.5–5.5)
RBC # BLD AUTO: 4.14 M/UL (ref 4.2–5.3)
RBC MORPH BLD: ABNORMAL
SODIUM SERPL-SCNC: 153 MMOL/L (ref 136–145)
WBC # BLD AUTO: 19.8 K/UL (ref 4.6–13.2)

## 2018-10-11 PROCEDURE — 65270000029 HC RM PRIVATE

## 2018-10-11 PROCEDURE — 82330 ASSAY OF CALCIUM: CPT | Performed by: NURSE PRACTITIONER

## 2018-10-11 PROCEDURE — 74011250636 HC RX REV CODE- 250/636: Performed by: NURSE PRACTITIONER

## 2018-10-11 PROCEDURE — 85025 COMPLETE CBC W/AUTO DIFF WBC: CPT | Performed by: EMERGENCY MEDICINE

## 2018-10-11 PROCEDURE — 83605 ASSAY OF LACTIC ACID: CPT

## 2018-10-11 PROCEDURE — 74011250636 HC RX REV CODE- 250/636: Performed by: EMERGENCY MEDICINE

## 2018-10-11 PROCEDURE — 74011000258 HC RX REV CODE- 258: Performed by: EMERGENCY MEDICINE

## 2018-10-11 PROCEDURE — 74011250636 HC RX REV CODE- 250/636: Performed by: HOSPITALIST

## 2018-10-11 PROCEDURE — 74011636637 HC RX REV CODE- 636/637: Performed by: HOSPITALIST

## 2018-10-11 PROCEDURE — 74011250637 HC RX REV CODE- 250/637: Performed by: HOSPITALIST

## 2018-10-11 PROCEDURE — 82962 GLUCOSE BLOOD TEST: CPT

## 2018-10-11 PROCEDURE — 74011000258 HC RX REV CODE- 258: Performed by: HOSPITALIST

## 2018-10-11 PROCEDURE — 74011000258 HC RX REV CODE- 258: Performed by: NURSE PRACTITIONER

## 2018-10-11 PROCEDURE — 80048 BASIC METABOLIC PNL TOTAL CA: CPT | Performed by: EMERGENCY MEDICINE

## 2018-10-11 PROCEDURE — 36415 COLL VENOUS BLD VENIPUNCTURE: CPT | Performed by: EMERGENCY MEDICINE

## 2018-10-11 RX ORDER — GABAPENTIN 250 MG/5ML
250 SOLUTION ORAL 3 TIMES DAILY
Status: DISCONTINUED | OUTPATIENT
Start: 2018-10-11 | End: 2018-10-11 | Stop reason: CLARIF

## 2018-10-11 RX ORDER — LISINOPRIL 10 MG/1
20 TABLET ORAL DAILY
Status: DISCONTINUED | OUTPATIENT
Start: 2018-10-11 | End: 2018-10-15 | Stop reason: HOSPADM

## 2018-10-11 RX ORDER — ASPIRIN 325 MG/1
100 TABLET, FILM COATED ORAL DAILY
Status: DISCONTINUED | OUTPATIENT
Start: 2018-10-11 | End: 2018-10-15 | Stop reason: HOSPADM

## 2018-10-11 RX ORDER — DEXTROSE MONOHYDRATE 25 G/50ML
25-50 INJECTION, SOLUTION INTRAVENOUS AS NEEDED
Status: DISCONTINUED | OUTPATIENT
Start: 2018-10-11 | End: 2018-10-15 | Stop reason: HOSPADM

## 2018-10-11 RX ORDER — INSULIN LISPRO 100 [IU]/ML
INJECTION, SOLUTION INTRAVENOUS; SUBCUTANEOUS
Status: DISCONTINUED | OUTPATIENT
Start: 2018-10-11 | End: 2018-10-15 | Stop reason: HOSPADM

## 2018-10-11 RX ORDER — FOLIC ACID 1 MG/1
1 TABLET ORAL DAILY
Status: DISCONTINUED | OUTPATIENT
Start: 2018-10-11 | End: 2018-10-15 | Stop reason: HOSPADM

## 2018-10-11 RX ORDER — ENALAPRIL MALEATE 2.5 MG/1
2.5 TABLET ORAL DAILY
Status: DISCONTINUED | OUTPATIENT
Start: 2018-10-11 | End: 2018-10-11

## 2018-10-11 RX ORDER — INSULIN GLARGINE 100 [IU]/ML
25 INJECTION, SOLUTION SUBCUTANEOUS
Status: DISCONTINUED | OUTPATIENT
Start: 2018-10-11 | End: 2018-10-11

## 2018-10-11 RX ORDER — CLONIDINE HYDROCHLORIDE 0.1 MG/1
0.1 TABLET ORAL 2 TIMES DAILY
Status: DISCONTINUED | OUTPATIENT
Start: 2018-10-11 | End: 2018-10-15 | Stop reason: HOSPADM

## 2018-10-11 RX ORDER — HYDRALAZINE HYDROCHLORIDE 20 MG/ML
10 INJECTION INTRAMUSCULAR; INTRAVENOUS
Status: DISCONTINUED | OUTPATIENT
Start: 2018-10-11 | End: 2018-10-15 | Stop reason: HOSPADM

## 2018-10-11 RX ORDER — MAGNESIUM SULFATE 100 %
4 CRYSTALS MISCELLANEOUS AS NEEDED
Status: DISCONTINUED | OUTPATIENT
Start: 2018-10-11 | End: 2018-10-15 | Stop reason: HOSPADM

## 2018-10-11 RX ORDER — MIRTAZAPINE 15 MG/1
15 TABLET, FILM COATED ORAL
Status: DISCONTINUED | OUTPATIENT
Start: 2018-10-11 | End: 2018-10-15 | Stop reason: HOSPADM

## 2018-10-11 RX ORDER — SIMVASTATIN 40 MG/1
40 TABLET, FILM COATED ORAL
Status: DISCONTINUED | OUTPATIENT
Start: 2018-10-11 | End: 2018-10-15 | Stop reason: HOSPADM

## 2018-10-11 RX ORDER — INSULIN GLARGINE 100 [IU]/ML
15 INJECTION, SOLUTION SUBCUTANEOUS
Status: DISCONTINUED | OUTPATIENT
Start: 2018-10-11 | End: 2018-10-11

## 2018-10-11 RX ORDER — GABAPENTIN 250 MG/5ML
250 SOLUTION ORAL 3 TIMES DAILY
Status: DISCONTINUED | OUTPATIENT
Start: 2018-10-11 | End: 2018-10-15 | Stop reason: HOSPADM

## 2018-10-11 RX ORDER — GUAIFENESIN 100 MG/5ML
81 LIQUID (ML) ORAL DAILY
Status: DISCONTINUED | OUTPATIENT
Start: 2018-10-11 | End: 2018-10-15 | Stop reason: HOSPADM

## 2018-10-11 RX ORDER — LISINOPRIL 10 MG/1
10 TABLET ORAL DAILY
Status: DISCONTINUED | OUTPATIENT
Start: 2018-10-11 | End: 2018-10-11

## 2018-10-11 RX ORDER — INSULIN GLARGINE 100 [IU]/ML
15 INJECTION, SOLUTION SUBCUTANEOUS DAILY
Status: DISCONTINUED | OUTPATIENT
Start: 2018-10-12 | End: 2018-10-15 | Stop reason: HOSPADM

## 2018-10-11 RX ORDER — DEXTROSE, SODIUM CHLORIDE, AND POTASSIUM CHLORIDE 5; .45; .15 G/100ML; G/100ML; G/100ML
100 INJECTION INTRAVENOUS CONTINUOUS
Status: DISCONTINUED | OUTPATIENT
Start: 2018-10-11 | End: 2018-10-12

## 2018-10-11 RX ADMIN — GABAPENTIN 250 MG: 250 SOLUTION ORAL at 23:22

## 2018-10-11 RX ADMIN — PIPERACILLIN SODIUM,TAZOBACTAM SODIUM 4.5 G: 4; .5 INJECTION, POWDER, FOR SOLUTION INTRAVENOUS at 01:26

## 2018-10-11 RX ADMIN — GABAPENTIN 250 MG: 250 SOLUTION ORAL at 13:41

## 2018-10-11 RX ADMIN — DEXTROSE MONOHYDRATE, SODIUM CHLORIDE, AND POTASSIUM CHLORIDE 100 ML/HR: 50; 4.5; 1.49 INJECTION, SOLUTION INTRAVENOUS at 23:22

## 2018-10-11 RX ADMIN — LEVETIRACETAM 750 MG: 500 TABLET, FILM COATED ORAL at 09:48

## 2018-10-11 RX ADMIN — LEVOFLOXACIN 750 MG: 5 INJECTION, SOLUTION INTRAVENOUS at 23:21

## 2018-10-11 RX ADMIN — CALCIUM GLUCONATE 2 G: 94 INJECTION, SOLUTION INTRAVENOUS at 13:39

## 2018-10-11 RX ADMIN — Medication 100 MG: at 09:48

## 2018-10-11 RX ADMIN — CLONIDINE HYDROCHLORIDE 0.1 MG: 0.1 TABLET ORAL at 17:08

## 2018-10-11 RX ADMIN — DEXTROSE MONOHYDRATE, SODIUM CHLORIDE, AND POTASSIUM CHLORIDE 100 ML/HR: 50; 4.5; 1.49 INJECTION, SOLUTION INTRAVENOUS at 13:52

## 2018-10-11 RX ADMIN — PIPERACILLIN SODIUM,TAZOBACTAM SODIUM 4.5 G: 4; .5 INJECTION, POWDER, FOR SOLUTION INTRAVENOUS at 17:11

## 2018-10-11 RX ADMIN — DEXTROSE MONOHYDRATE, SODIUM CHLORIDE, AND POTASSIUM CHLORIDE 100 ML/HR: 50; 4.5; 1.49 INJECTION, SOLUTION INTRAVENOUS at 04:07

## 2018-10-11 RX ADMIN — LISINOPRIL 20 MG: 10 TABLET ORAL at 23:32

## 2018-10-11 RX ADMIN — PIPERACILLIN SODIUM,TAZOBACTAM SODIUM 4.5 G: 4; .5 INJECTION, POWDER, FOR SOLUTION INTRAVENOUS at 09:48

## 2018-10-11 RX ADMIN — HYDRALAZINE HYDROCHLORIDE 10 MG: 20 INJECTION INTRAMUSCULAR; INTRAVENOUS at 19:10

## 2018-10-11 RX ADMIN — MIRTAZAPINE 15 MG: 15 TABLET, FILM COATED ORAL at 23:27

## 2018-10-11 RX ADMIN — SIMVASTATIN 40 MG: 40 TABLET, FILM COATED ORAL at 23:27

## 2018-10-11 RX ADMIN — CLONIDINE HYDROCHLORIDE 0.1 MG: 0.1 TABLET ORAL at 09:48

## 2018-10-11 RX ADMIN — ASPIRIN 81 MG CHEWABLE TABLET 81 MG: 81 TABLET CHEWABLE at 09:48

## 2018-10-11 RX ADMIN — INSULIN GLARGINE 25 UNITS: 100 INJECTION, SOLUTION SUBCUTANEOUS at 03:59

## 2018-10-11 RX ADMIN — FOLIC ACID 1 MG: 1 TABLET ORAL at 09:48

## 2018-10-11 RX ADMIN — ENALAPRIL MALEATE 2.5 MG: 2.5 TABLET ORAL at 10:55

## 2018-10-11 RX ADMIN — LEVETIRACETAM 750 MG: 500 TABLET, FILM COATED ORAL at 17:08

## 2018-10-11 NOTE — ED NOTES
TRANSFER - ED to INPATIENT REPORT: 
 
SBAR report made available to receiving floor on this patient being transferred to [address] [place] [address] ([facility])  for routine progression of care Admitting diagnosis Seizures ([facility] [address] Sepsis ([facility] [address] UTI (urinary tract infection) Information from the following report(s) SBAR, Kardex, ED Summary and MAR was made available to receiving floor. Lines:  
Peripheral IV 10/10/18 Left Hand (Active) Site Assessment Clean, dry, & intact 10/10/2018  7:15 PM  
Phlebitis Assessment 0 10/10/2018  7:15 PM  
Infiltration Assessment 0 10/10/2018  7:15 PM  
Dressing Status Clean, dry, & intact 10/10/2018  7:15 PM  
Dressing Type Transparent 10/10/2018  7:15 PM  
Hub Color/Line Status Pink 10/10/2018  7:15 PM  
   
Peripheral IV 10/10/18 Left Forearm (Active) Site Assessment Clean, dry, & intact 10/10/2018  9:44 PM  
Phlebitis Assessment 0 10/10/2018  9:44 PM  
Infiltration Assessment 0 10/10/2018  9:44 PM  
Dressing Status Clean, dry, & intact 10/10/2018  9:44 PM  
Hub Color/Line Status Infusing 10/10/2018  9:44 PM  
Action Taken Blood drawn 10/10/2018  9:44 PM  
  
 
Medication list unable to confirm Opportunity for questions and clarification was provided. Patient is disoriented High alert med Patient is  incontinent and non-ambulatory Patient transported with: 
 Monitor Registered Nurse

## 2018-10-11 NOTE — CDMP QUERY
Please clarify if this patient is being treated/managed for: 
 
=>Lactic acidosis due  to Sepsis =>Other Explanation of clinical findings =>Unable to Determine (no explanation of clinical findings) The medical record reflects the following: 
 
Risk:  Sepsis 2/2 UTI Clinical Indicators:Lactic acidosis 5.6 Temp 100.2, wbc 34.9,  93 neutrophils Treatment: IVF, IV zosyn, IV levaquin Please clarify and document your clinical opinion in the progress notes and discharge summary. Thank you, [facility], [address]

## 2018-10-11 NOTE — PROGRESS NOTES
Patient is unable to communicate at this time.  offered prayer and left Spiritual Care brochure. Chaplains will continue to follow and will provide pastoral care on an as needed/requested basis. [facility] Staff  Spiritual Care  
[phone]

## 2018-10-11 NOTE — ROUTINE PROCESS
0730 Bedside, Verbal and Written shift change report given to [address] [place] [address],[address] (oncoming nurse) by Talita Do RN (offgoing nurse). Report included the following information SBAR and Kardex. Pt in bed, lying on left side, no signs of pain or distress, call bell in reach 1100 Oral gabapentin not available from pharmacy this morning, spoke to [address] in pharmacy she stated they are working on it 0543 dr Juan C Alvarado called back, made aware of elevated bp, stated he will put something in for it 1910 bp 171/115- 10 mg hydralazine given 1930 Bedside, Verbal and Written shift change report given to Sandra VALERIO (oncoming nurse) by Mariangel RN (offgoing nurse). Report included the following information SBAR and Kardex. Pt resting in bed, somewhat restless, but no signs of distress, call bell in reach, bed locked in low position

## 2018-10-11 NOTE — PROGRESS NOTES
Admitted earlier this morning with sepsis from UTI, hx of seizures. Follow cultures. Continue IV abx. Seizure precautions. Repeat ionized calcium this evening. DONNELL Patton [address] [place], [place] [address] Hospitalist Division Pager:  [phone] Office:  [phone]

## 2018-10-11 NOTE — ED NOTES
Pt noted to be seizing approx 1 minute. Pt began shaking while laying on R side. Dr. Lili Ruvalcaba at bedside. No injuries noted.

## 2018-10-11 NOTE — H&P
History and Physical 
 
Patient: Melvin Montano               Sex: female          DOA: 10/10/2018 YOB: 1950      Age:  80 y.o.        LOS:  LOS: 1 day HPI:  
 
Melvin Montano is a 80 y.o. female who was brought to the ER because of seizures. She has a history of seizures in the past and also of CVA and CAD. She did not have known fever and she did not have dysuria. In the ER she was found to have sepsis from UTI as well as her seizures. She had decrease in her mental status, but her baseline mental status is unclear. She will be admitted for ongoing management. Past Medical History:  
Diagnosis Date  Aphasia  CAD (coronary artery disease)  Diabetes ([facility]  Dysarthria  Hypertension  Seizures ([facility] [address]  Stroke ([facility] [address] Social History: 
 Tobacco use:  Patient does not smoke Alcohol use:  Patient does not use alcohol Patient lives with family Family History: 
 Unknown Review of Systems Constitutional:  Fever as above, no weight loss HEENT:  No headache or visual changes Cardiovascular:  No chest pain or diaphoresis Respiratory:  No coughing, wheezing, or shortness of breath. GI:  No nausea or vomitting. No diarrhea :  No hematuria or dysuria Skin:  No rashes or moles Neuro:  Previous CVA, known seizures Hematological:  No bruising or bleeding Endocrine:  No diabetes or thyroid disease Physical Exam:  
  
Visit Vitals  /72 (BP 1 Location: Left arm, BP Patient Position: At rest)  Pulse 84  Temp 97.6 °F (36.4 °C)  Resp 20  
 Ht 5' 3\" (1.6 m)  Wt 59 kg (130 lb)  SpO2 100%  BMI 23.03 kg/m2 Physical Exam: 
 
Gen:  Agitated, not interactive HEENT:  Normal cephalic atraumatic, extra-occular movements are intact. Neck:  Supple, No JVD Lungs:  Clear bilaterally, no wheeze, no rales, normal effort Heart:  Regular Rate and Rhythm, normal S1 and S2, no edema Abdomen:  Soft, non tender, normal bowel sounds, no guarding. Extremities:  Well perfused, no cyanosis or edema Neurological:  Not speaking clearly. Normal tone without seizure Skin:  No rashes or moles Laboratory Studies: All lab results for the last 24 hours reviewed. Assessment/Plan Principal Problem: 
  Sepsis ([facility] [address] (10/10/2018) Active Problems: 
  UTI (urinary tract infection) (10/10/2018) [facility] (10/10/2018) Type II diabetes mellitus ([facility] (10/11/2018) HTN (hypertension) (10/11/2018) CAD (coronary artery disease) (10/11/2018) Cerebrovascular disease (10/11/2018) PLAN: 
 
Continue with IV antibiotics Follow cultures BS control Monitor mental status BP control DVT prophylaxis.

## 2018-10-11 NOTE — PROGRESS NOTES
Attempted to interview pt this morning. Pt unable to respond to questions due to altered mental status. Called to speak with son Niko Porter.   Left message at 11:10am

## 2018-10-11 NOTE — ROUTINE PROCESS
0200 TRANSFER - IN REPORT: 
 received from ED(unit) for routine progression of care Report consisted of patients Situation, Background, Assessment and  
Recommendations(SBAR). Information from the following report(s) ED Summary and MAR . Side rails padded. Assessment completed upon patients arrival to unit and care assumed. 0300  Pt. Continues to be restless and agitation at times. 0400  Resting comfortably in bed, no sign of discomfort. 0500 Pt. Made no complaint at this time. 0550  Pt. Resting comfortably in bed, no sign of distress. Verbal and bedside Shift changed report given to Kaila Lomeli RN (oncoming RN) on Pt. Condition. Report consisted of patients Situation, History, Activities, intake/output,  Background, Assessment and Recommendations(SBAR). Information from the following report(s) Kardex, order Summary, Lab results and MAR was reviewed with the receiving nurse. Opportunity for questions and clarification was provided.

## 2018-10-12 LAB
ATRIAL RATE: 93 BPM
CA-I SERPL-SCNC: 0.78 MMOL/L (ref 1.12–1.32)
CALCULATED P AXIS, ECG09: 46 DEGREES
CALCULATED R AXIS, ECG10: 4 DEGREES
CALCULATED T AXIS, ECG11: 40 DEGREES
DIAGNOSIS, 93000: NORMAL
GLUCOSE BLD STRIP.AUTO-MCNC: 123 MG/DL (ref 70–110)
GLUCOSE BLD STRIP.AUTO-MCNC: 176 MG/DL (ref 70–110)
GLUCOSE BLD STRIP.AUTO-MCNC: 196 MG/DL (ref 70–110)
GLUCOSE BLD STRIP.AUTO-MCNC: 92 MG/DL (ref 70–110)
P-R INTERVAL, ECG05: 146 MS
Q-T INTERVAL, ECG07: 444 MS
QRS DURATION, ECG06: 68 MS
QTC CALCULATION (BEZET), ECG08: 552 MS
VENTRICULAR RATE, ECG03: 93 BPM

## 2018-10-12 PROCEDURE — 74011250636 HC RX REV CODE- 250/636: Performed by: EMERGENCY MEDICINE

## 2018-10-12 PROCEDURE — 65270000029 HC RM PRIVATE

## 2018-10-12 PROCEDURE — 74011250637 HC RX REV CODE- 250/637: Performed by: NURSE PRACTITIONER

## 2018-10-12 PROCEDURE — 74011250636 HC RX REV CODE- 250/636: Performed by: HOSPITALIST

## 2018-10-12 PROCEDURE — 74011250637 HC RX REV CODE- 250/637: Performed by: HOSPITALIST

## 2018-10-12 PROCEDURE — 74011636637 HC RX REV CODE- 636/637: Performed by: HOSPITALIST

## 2018-10-12 PROCEDURE — 74011000258 HC RX REV CODE- 258: Performed by: HOSPITALIST

## 2018-10-12 PROCEDURE — 74011000258 HC RX REV CODE- 258: Performed by: NURSE PRACTITIONER

## 2018-10-12 PROCEDURE — 74011250636 HC RX REV CODE- 250/636: Performed by: NURSE PRACTITIONER

## 2018-10-12 PROCEDURE — 82962 GLUCOSE BLOOD TEST: CPT

## 2018-10-12 RX ORDER — LORAZEPAM 2 MG/ML
1 INJECTION INTRAMUSCULAR ONCE
Status: DISCONTINUED | OUTPATIENT
Start: 2018-10-12 | End: 2018-10-12

## 2018-10-12 RX ORDER — MAGNESIUM SULFATE HEPTAHYDRATE 40 MG/ML
2 INJECTION, SOLUTION INTRAVENOUS ONCE
Status: COMPLETED | OUTPATIENT
Start: 2018-10-12 | End: 2018-10-13

## 2018-10-12 RX ORDER — DEXTROSE MONOHYDRATE 50 MG/ML
75 INJECTION, SOLUTION INTRAVENOUS CONTINUOUS
Status: DISCONTINUED | OUTPATIENT
Start: 2018-10-12 | End: 2018-10-15 | Stop reason: HOSPADM

## 2018-10-12 RX ORDER — LORAZEPAM 1 MG/1
1 TABLET ORAL
Status: COMPLETED | OUTPATIENT
Start: 2018-10-12 | End: 2018-10-12

## 2018-10-12 RX ORDER — MAGNESIUM SULFATE 1 G/100ML
1 INJECTION INTRAVENOUS ONCE
Status: COMPLETED | OUTPATIENT
Start: 2018-10-12 | End: 2018-10-13

## 2018-10-12 RX ADMIN — MAGNESIUM SULFATE HEPTAHYDRATE 2 G: 40 INJECTION, SOLUTION INTRAVENOUS at 11:49

## 2018-10-12 RX ADMIN — LEVOFLOXACIN 750 MG: 5 INJECTION, SOLUTION INTRAVENOUS at 20:38

## 2018-10-12 RX ADMIN — CLONIDINE HYDROCHLORIDE 0.1 MG: 0.1 TABLET ORAL at 09:00

## 2018-10-12 RX ADMIN — Medication 100 MG: at 08:43

## 2018-10-12 RX ADMIN — PIPERACILLIN SODIUM,TAZOBACTAM SODIUM 4.5 G: 4; .5 INJECTION, POWDER, FOR SOLUTION INTRAVENOUS at 01:13

## 2018-10-12 RX ADMIN — PIPERACILLIN SODIUM,TAZOBACTAM SODIUM 4.5 G: 4; .5 INJECTION, POWDER, FOR SOLUTION INTRAVENOUS at 09:00

## 2018-10-12 RX ADMIN — LEVETIRACETAM 750 MG: 500 TABLET, FILM COATED ORAL at 08:43

## 2018-10-12 RX ADMIN — CLONIDINE HYDROCHLORIDE 0.1 MG: 0.1 TABLET ORAL at 17:54

## 2018-10-12 RX ADMIN — LEVETIRACETAM 750 MG: 500 TABLET, FILM COATED ORAL at 17:54

## 2018-10-12 RX ADMIN — PIPERACILLIN SODIUM,TAZOBACTAM SODIUM 4.5 G: 4; .5 INJECTION, POWDER, FOR SOLUTION INTRAVENOUS at 17:54

## 2018-10-12 RX ADMIN — DEXTROSE MONOHYDRATE, SODIUM CHLORIDE, AND POTASSIUM CHLORIDE 100 ML/HR: 50; 4.5; 1.49 INJECTION, SOLUTION INTRAVENOUS at 08:04

## 2018-10-12 RX ADMIN — CALCIUM GLUCONATE 2 G: 94 INJECTION, SOLUTION INTRAVENOUS at 12:42

## 2018-10-12 RX ADMIN — FOLIC ACID 1 MG: 1 TABLET ORAL at 08:43

## 2018-10-12 RX ADMIN — GABAPENTIN 250 MG: 250 SOLUTION ORAL at 12:37

## 2018-10-12 RX ADMIN — MAGNESIUM SULFATE HEPTAHYDRATE 1 G: 1 INJECTION, SOLUTION INTRAVENOUS at 14:33

## 2018-10-12 RX ADMIN — DEXTROSE MONOHYDRATE 75 ML/HR: 5 INJECTION, SOLUTION INTRAVENOUS at 11:44

## 2018-10-12 RX ADMIN — ASPIRIN 81 MG CHEWABLE TABLET 81 MG: 81 TABLET CHEWABLE at 08:43

## 2018-10-12 RX ADMIN — GABAPENTIN 250 MG: 250 SOLUTION ORAL at 17:54

## 2018-10-12 RX ADMIN — LISINOPRIL 20 MG: 10 TABLET ORAL at 08:43

## 2018-10-12 RX ADMIN — LORAZEPAM 1 MG: 1 TABLET ORAL at 12:37

## 2018-10-12 NOTE — PROGRESS NOTES
Spoke this am by phone with pt's son Davon Romero. He stated that he has POA for his mother. Asked that he bring the POA document in to hospital so that we can make copy. He stated that he would have to look for it. When I asked if he was the decision maker for the pt, he stated that \"we all make the decisions equally\" . Two other siblings are Bonita Ricks ([phone]) and Jaime Burt ([phone]). Received permission to speak with these two siblings. Tyler stated that the other two siblings may have information he does not. I then spoke in person with Bonita Ricks. He stated \"my brother does not have POA\". Pt has lived with Taiwo for the past 7 years. She was in a facility for 2 years after stroke, then moved in with Demond. He states he has been doing full personal care for his mom for the past 4 years (he is a CNA and is paid by [facility] to provide this care). States that they have a second floor apartment and that the family feels pt needs to go into a nursing facility at this time due to the fact that Demond feels he is not able to provide the best care for her. States she \"sits in her chair all day and doesn't do anything\". He informed me that the NP who visits pt every 2-3 mos. recommended facility placement, as did the Medicaid worker who came out to the house. We discussed facility placement, and I confirmed that he would like me to send out inquires to all area nursing facilities. Gave him a list of facilities so that he and siblings can come up with preferences. Reason for Admission:   Seizures, Sepsis, UTI 
                
RRAT Score:          7 Plan for utilizing home health: Will continue to assess Likelihood of Readmission:  Low/yellow Transition of Care Plan:    Placed referrals with Raul and Swati for long term facility placement.   Family in agreement that they would Dillon Hindu her to go to a facility and is reviewing list.       
 
 
Patient has designated ________________________ to participate in his/her discharge plan and to receive any needed information. Pt unable to designate a DC caregiver. Son that patient lives with is 
 
Name: Destiny Solano [address] [place] [address] Phone number: [phone] Care Management Interventions PCP Verified by CM: Yes (Pt's son Johanna Farooq states that his mother does not see a Dr., but that an NP visits her at home kimberly 2-3 mos to check vitals, skin condition, etc.  States NP is Soledad Mariscal ([address] [facility] [phone])) Palliative Care Criteria Met (RRAT>21 & CHF Dx)?: No 
Mode of Transport at Discharge: Self Current Support Network: [employer], Lives with Caregiver, Has Personal Caregivers Confirm Follow Up Transport: Family Plan discussed with Pt/Family/Caregiver: Yes Discharge Location Discharge Placement: Home

## 2018-10-12 NOTE — PROGRESS NOTES
Problem: Discharge Planning Goal: *Discharge to safe environment Outcome: Progressing Towards Goal 
Plan LTC

## 2018-10-12 NOTE — PROGRESS NOTES
[facility] Inpatient Daily Progress Note Daily progress Note Patient: Arya Velasquez MRN: [id number]  CSN: [id number] YOB: 1950  Age: 80 y.o. Sex: female DOA: 10/10/2018 LOS:  LOS: 2 days Chief Complaint:  Sepsis Interval History: 81 y/o [place] American female with hx of seizures, CAD, HTN, DM and CVA, presented to the ED on 10/10 with seizures. Per EMR, she had a decrease in mental status but her baseline mental status is unclear. Work up in the ED note lactic acid 5.5, WBC of 34.9, UA with large leukocyte esterase, TNTC WBC's, 3+ bacteria, severe electrolyte imbalance, troponin 0.07, blood cultures collected x 2. CT head no acute intracranial abnormality, prior left sided MCA infarct w/ associated encephalomalacia and ex vacuo prominence of left lateral ventricle unchanged. She was started on IV abx and admitted for further management of care. Urine culture with > 100,000 colonies GNR. Blood cultures with GNR peds bottle. Assessment/Plan:  
 
Patient Active Problem List  
Diagnosis Code  Type II or unspecified type diabetes mellitus without mention of complication, not stated as uncontrolled E11.9  Seizures (HCC) R56.9  
 UTI (urinary tract infection) N39.0  Sepsis ([facility] [address] A41.9  Type II diabetes mellitus (HCC) E11.9  
 CAD (coronary artery disease) I25.10  Cerebrovascular disease I67.9  
 HTN (hypertension) I10  
 
 
A/P: 
Sepsis 
- lactic acid 5.5 - -> 2.3 - -> 2.9 
- WBC 34.9K - -> 19.8K 
- UA with large leukocyte esterase, TNTC WBC's, 3+ bacteria 
- urine culture > 100,000 colonies GNR 
- blood cultures collected GNR peds bottle - IV Zosyn, Levaquin 
- follow cultures - trend lactic acid UTI 
- UA with large leukocyte esterase, TNTC WBC's, 3+ bacteria 
- urine culture pending 
- continue IV abx Bacteremia - blood cultures collected 10/10 GNR peds bottle - follow cultures 
- continue IV abx Seizures - Keppra 750 mg BID 
- also with severe electrolyte imbalance and UTI Electrolyte imbalance 
- replace and monitor 
- tele monitor Diabetes 
- monitor accuchecks, correctional SSI, Lantus 15 units daily Hypertension - Clonidine 0.1 mg BID, Lisinopril 20 mg daily DVT Prophylaxis - SCD's BLE Cely Hayward, FNP-C [facility] Pager:  [phone] Office:  [phone] Subjective:  
  
Discussed with primary RN, combative at times. Had to start new IV. No acute events overnight. Objective:  
  
Visit Vitals  /62 (BP 1 Location: Left arm, BP Patient Position: At rest)  Pulse 66  Temp 98 °F (36.7 °C)  Resp 20  
 Ht 5' 3\" (1.6 m)  Wt 59 kg (130 lb)  SpO2 92%  BMI 23.03 kg/m2 Physical Exam: 
General appearance: alert, cooperative, no distress, appears stated age Head: Normocephalic, without obvious abnormality, atraumatic Lungs: clear to auscultation bilaterally Heart: regular rate and rhythm, S1, S2 normal, no murmur, click, rub or gallop Abdomen: soft, non tender, non distended. Normoactive bowel sounds. No masses, no organomegaly Extremities: extremities normal, atraumatic, no cyanosis or edema Skin: Skin color, texture, turgor normal. No rashes or lesions Neurologic: limited verbalization, unclear of baseline mental status, combative/agitated at times, [place] 
PSY: Mood and affect normal, appropriately behaved Intake and Output: 
Current Shift:    
Last three shifts:  10/10 1901 - 10/12 0700 In: 648.3 [P.O.:360; I.V.:288.3] Out: 800 [Urine:800] Recent Results (from the past 24 hour(s)) GLUCOSE, POC Collection Time: 10/11/18  5:08 PM  
Result Value Ref Range Glucose (POC) 108 70 - 110 mg/dL CALCIUM, IONIZED Collection Time: 10/11/18  9:00 PM  
Result Value Ref Range  Ionized Calcium 0.78 (LL) 1.12 - 1.32 MMOL/L  
 GLUCOSE, POC Collection Time: 10/11/18  9:04 PM  
Result Value Ref Range Glucose (POC) 132 (H) 70 - 110 mg/dL GLUCOSE, POC Collection Time: 10/12/18  7:49 AM  
Result Value Ref Range Glucose (POC) 123 (H) 70 - 110 mg/dL Lab Results Component Value Date/Time Glucose 95 10/11/2018 03:55 AM  
 Glucose 118 (H) 10/10/2018 07:40 PM  
 Glucose 141 (H) 06/15/2015 07:36 PM  
 Glucose 101 (H) 02/05/2014 04:45 PM  
 Glucose 118 (H) 11/30/2013 10:15 AM  
  
 
Imaging: No results found. Medication List Reviewed: 
Current Facility-Administered Medications Medication Dose Route Frequency  aspirin chewable tablet 81 mg  81 mg Oral DAILY  cloNIDine HCl (CATAPRES) tablet 0.1 mg  0.1 mg Oral BID  folic acid (FOLVITE) tablet 1 mg  1 mg Oral DAILY  levETIRAcetam (KEPPRA) tablet 750 mg  750 mg Oral BID  mirtazapine (REMERON) tablet 15 mg  15 mg Oral QHS  simvastatin (ZOCOR) tablet 40 mg  40 mg Oral QHS  Thiamine Mononitrate (B-1) tablet 100 mg  100 mg Oral DAILY  dextrose 5% - 0.45% NaCl with KCl 20 mEq/L infusion  100 mL/hr IntraVENous CONTINUOUS  
 influenza vaccine 2018-19 (6 mos+)(PF) (FLUARIX QUAD/FLULAVAL QUAD) injection 0.5 mL  0.5 mL IntraMUSCular PRIOR TO DISCHARGE  gabapentin (NEURONTIN) 250 mg/5 mL solution 250 mg  250 mg Oral TID  insulin lispro (HUMALOG) injection   SubCUTAneous AC&HS  
 glucose chewable tablet 16 g  4 Tab Oral PRN  
 glucagon (GLUCAGEN) injection 1 mg  1 mg IntraMUSCular PRN  
 dextrose (D50) infusion 12.5-25 g  25-50 mL IntraVENous PRN  piperacillin-tazobactam (ZOSYN) 4.5 g in 0.9% sodium chloride (MBP/ADV) 100 mL MBP  #EXTENDED 4-HOUR INFUSION##  4.5 g IntraVENous Q8H  
 insulin glargine (LANTUS) injection 15 Units  15 Units SubCUTAneous DAILY  lisinopril (PRINIVIL, ZESTRIL) tablet 20 mg  20 mg Oral DAILY  hydrALAZINE (APRESOLINE) 20 mg/mL injection 10 mg  10 mg IntraVENous Q6H PRN  
  sodium chloride (NS) flush 5-10 mL  5-10 mL IntraVENous PRN  
 levoFLOXacin (LEVAQUIN) 750 mg in D5W IVPB  750 mg IntraVENous Q24H

## 2018-10-12 NOTE — PROGRESS NOTES
9337: PT orders received and chart reviewed. Attempted PT evaluation. Patient verbalization limited, unable to provided name or follow any commands. Patient appeared to pull PIV. RN Mariangel notified. Will d/c orders at this time. Please re-order if patient has change in status and is appropriate for skilled PT. Thank you for this referral, Jacob Felton PT, DPT Office extension: [phone] Pager #: [phone]

## 2018-10-12 NOTE — PROGRESS NOTES
Problem: Falls - Risk of 
Goal: *Absence of Falls Document [facility] Fall Risk and appropriate interventions in the flowsheet. Outcome: Progressing Towards Goal 
Fall Risk Interventions: 
Mobility Interventions: Bed/chair exit alarm, Communicate number of staff needed for ambulation/transfer Mentation Interventions: Door open when patient unattended, Reorient patient Medication Interventions: Bed/chair exit alarm Elimination Interventions: Bed/chair exit alarm

## 2018-10-12 NOTE — PROGRESS NOTES
10/12/2018  Verified patients SNF benefit with Deion Norris at [place]. Per Deion Norris patient is covered at 100% / No Copay / No Deductible. Maria A Cullen Care Management Specialist / [facility]

## 2018-10-12 NOTE — DIABETES MGMT
Nutritional Assessment and Glycemic Control Point of Care Veronika Del Cid           65 y.o.      10/10/18               Patient Active Problem List  
  
Patient Active Problem List  
Diagnosis Code  Type II or unspecified type diabetes mellitus without mention of complication, not stated as uncontrolled E11.9  Seizures (HCC) R56.9  
 UTI (urinary tract infection) N39.0  Sepsis ([address] A41.9  Type II diabetes mellitus (HCC) E11.9  
 CAD (coronary artery disease) I25.10  Cerebrovascular disease I67.9  
 HTN (hypertension) I10  
 
ASSESSMENT:  
Observed pt eating 100% of her lunch meal (eating rice with her hands). Pt is overweight related to excess caloric intake as evidenced by 113% ideal weight and BMI= 23kg/m2. Pt appears heavier. 
 Pt has minimal verbal interaction unable to provide DM ed at this time. INTERVENTIONS/PLAN:  
Recommend reduce Lantus from 25 units/d to 15 units plus corrective lispro. SUBJECTIVE/OBJECTIVE: Information obtained from: pt  
Diet: 1500 calorie Consistent CHO, 2 gram Na 
  
Medications: [x]                Reviewed   
 
Lab Results Component Value Date/Time Sodium 153 (H) 10/11/2018 03:55 AM  
 Potassium 3.6 10/11/2018 03:55 AM  
 Chloride 117 (H) 10/11/2018 03:55 AM  
 CO2 23 10/11/2018 03:55 AM  
 Anion gap 13 10/11/2018 03:55 AM  
 Glucose 95 10/11/2018 03:55 AM  
 BUN 6 (L) 10/11/2018 03:55 AM  
 Creatinine 0.77 10/11/2018 03:55 AM  
 BUN/Creatinine ratio 8 (L) 10/11/2018 03:55 AM  
 GFR est AA >60 10/11/2018 03:55 AM  
 GFR est non-AA >60 10/11/2018 03:55 AM  
 Calcium 5.7 (LL) 10/11/2018 03:55 AM  
 
Recent Glucose Results:  
Lab Results Component Value Date/Time GLUCPOC 92 10/12/2018 11:33 AM  
 GLUCPOC 123 (H) 10/12/2018 07:49 AM  
 GLUCPOC 132 (H) 10/11/2018 09:04 PM  
 
  
Anthropometrics: IBW : 56.8 kg (125 lb 3.5 oz),  , BMI (calculated): 23.7 Wt Readings from Last 1 Encounters:  
01/15/13 65.5 kg (144 lb 6.4 oz) Ht Readings from Last 1 Encounters:  
01/13/13 5' 5\" (1.651 m)  
  
  
Estimated Nutrition Needs: 1517 Kcals/day, Protein (g): 79 g Fluid (ml): 1974 ml Based on:   [x]          Actual BW                    []          IBW              []            Adjusted BW   
   
Nutrition Monitoring and Evaluation   
  
[x]     Monitor po intake on meal rounds 
[x]     Continue inpatient monitoring and intervention 
[]     Other: 
  
  
Nutrition Risk:  []   High                    []  Moderate                        [x]  Minimal/Uncompromised

## 2018-10-12 NOTE — ROUTINE PROCESS
1940: Assumed care. AMS. Restless & agitated. On seizure precautions, siderails padded. On RA. No discomfort noted. Call light within reach. 2310: Patient hard stick. Anesthesiologist came & placed gauge #20 to Rt hand. 2327: Due meds given. . No coverage given per protocol. 0113: Due med given. 0200: Sleeping. 
 
0331: Incontinence care provided. No change from previous assessment. 0600: Slept on & off thru night. Needs attended. 0800:Bedside and Verbal shift change report given to Asha Hedrick (oncoming nurse) by me (offgoing nurse). Report included the following information SBAR, Kardex, Intake/Output, MAR and Recent Results.

## 2018-10-12 NOTE — ROUTINE PROCESS
0900 pt agitated and combative while cleaning her up, pt refused insulin this morning and refused morning labs to be drawn, attempted to page Dr Robby Harrington, CNA attempted to obtain vitals, pt refused and tried to hit the CNA 
 
0438 continues to be agitated and combative, pulled IV out and refused to let me connect IV tubing to IV in left arm, zosyn was running but had to be turned off- refused to take clonidine, no call back yet from Dr James Kline, attempted to page Dr James Kline again 1100 pt still refusing to allow staff near her, able to clean up bowel movement but she still refuses to let me connect IV, able to get blood pressure but no other vitals obtained, attempted to page Dr James Kline [address] NP on floor- she was made aware of patient refusing her IV medications which includes her zosyn, IV fluids, and magnesium 1115 IV ativan ordered- when I gave it to patient her 20 g IV infiltrated. IV removed and Lb Naranjo will order PO ativan instead 1135 able to get 24 g IV in right hand, IV fluids and tubing changed and connected, IV wrapped in ACE wrap, pt educated on keeping IV in

## 2018-10-12 NOTE — PROGRESS NOTES
Problem: Pressure Injury - Risk of 
Goal: *Prevention of pressure injury Document Roby Scale and appropriate interventions in the flowsheet. Outcome: Progressing Towards Goal 
Pressure Injury Interventions: 
Sensory Interventions: Assess need for specialty bed, Keep linens dry and wrinkle-free, Pressure redistribution bed/mattress (bed type), Use 30-degree side-lying position Moisture Interventions: Absorbent underpads, Internal/External urinary devices Activity Interventions: Pressure redistribution bed/mattress(bed type) Mobility Interventions: HOB 30 degrees or less, Pressure redistribution bed/mattress (bed type) Nutrition Interventions: Document food/fluid/supplement intake Friction and Shear Interventions: HOB 30 degrees or less

## 2018-10-13 LAB
GLUCOSE BLD STRIP.AUTO-MCNC: 102 MG/DL (ref 70–110)
GLUCOSE BLD STRIP.AUTO-MCNC: 159 MG/DL (ref 70–110)
GLUCOSE BLD STRIP.AUTO-MCNC: 185 MG/DL (ref 70–110)

## 2018-10-13 PROCEDURE — 74011250636 HC RX REV CODE- 250/636: Performed by: EMERGENCY MEDICINE

## 2018-10-13 PROCEDURE — 74011250637 HC RX REV CODE- 250/637: Performed by: HOSPITALIST

## 2018-10-13 PROCEDURE — 65270000029 HC RM PRIVATE

## 2018-10-13 PROCEDURE — 74011000258 HC RX REV CODE- 258: Performed by: HOSPITALIST

## 2018-10-13 PROCEDURE — 74011636637 HC RX REV CODE- 636/637: Performed by: HOSPITALIST

## 2018-10-13 PROCEDURE — 74011250636 HC RX REV CODE- 250/636: Performed by: HOSPITALIST

## 2018-10-13 PROCEDURE — 82962 GLUCOSE BLOOD TEST: CPT

## 2018-10-13 RX ADMIN — PIPERACILLIN SODIUM,TAZOBACTAM SODIUM 4.5 G: 4; .5 INJECTION, POWDER, FOR SOLUTION INTRAVENOUS at 01:02

## 2018-10-13 RX ADMIN — PIPERACILLIN SODIUM,TAZOBACTAM SODIUM 4.5 G: 4; .5 INJECTION, POWDER, FOR SOLUTION INTRAVENOUS at 23:08

## 2018-10-13 RX ADMIN — INSULIN LISPRO 2 UNITS: 100 INJECTION, SOLUTION INTRAVENOUS; SUBCUTANEOUS at 00:41

## 2018-10-13 RX ADMIN — GABAPENTIN 250 MG: 250 SOLUTION ORAL at 00:41

## 2018-10-13 RX ADMIN — MIRTAZAPINE 15 MG: 15 TABLET, FILM COATED ORAL at 00:41

## 2018-10-13 RX ADMIN — LEVOFLOXACIN 750 MG: 5 INJECTION, SOLUTION INTRAVENOUS at 20:31

## 2018-10-13 RX ADMIN — GABAPENTIN 250 MG: 250 SOLUTION ORAL at 20:55

## 2018-10-13 RX ADMIN — MIRTAZAPINE 15 MG: 15 TABLET, FILM COATED ORAL at 20:55

## 2018-10-13 RX ADMIN — DEXTROSE MONOHYDRATE 75 ML/HR: 5 INJECTION, SOLUTION INTRAVENOUS at 17:56

## 2018-10-13 RX ADMIN — PIPERACILLIN SODIUM,TAZOBACTAM SODIUM 4.5 G: 4; .5 INJECTION, POWDER, FOR SOLUTION INTRAVENOUS at 13:58

## 2018-10-13 RX ADMIN — SIMVASTATIN 40 MG: 40 TABLET, FILM COATED ORAL at 00:41

## 2018-10-13 RX ADMIN — DEXTROSE MONOHYDRATE 75 ML/HR: 5 INJECTION, SOLUTION INTRAVENOUS at 04:17

## 2018-10-13 RX ADMIN — CLONIDINE HYDROCHLORIDE 0.1 MG: 0.1 TABLET ORAL at 20:56

## 2018-10-13 NOTE — PROGRESS NOTES
Progress Note Patient: Jerry Duncan               Sex: female          DOA: 10/10/2018 YOB: 1950      Age:  80 y.o.        LOS:  LOS: 3 days CHIEF COMPLAINT:  Sepsis, Seizures, Cerebrovascular Disease Subjective:  
 
Patient seems more alert today More interactive, although she did not actually speak Objective:  
  
Visit Vitals  /54 (BP 1 Location: Left arm, BP Patient Position: At rest)  Pulse (!) 59  Temp 97.7 °F (36.5 °C)  Resp 18  Ht 5' 3\" (1.6 m)  Wt 59 kg (130 lb)  SpO2 95%  BMI 23.03 kg/m2 Physical Exam: 
Gen:  No distress, no complaint Lungs:  Clear bilaterally, no wheeze or rhonchi Heart:  Regular rate and rhythm, no murmurs or gallops Abdomen:  Soft, non-tender, normal bowel sounds Lab/Data Reviewed: 
 
Labs reviewed Assessment/Plan Principal Problem: 
  Sepsis ([address] (10/10/2018) Active Problems: 
  UTI (urinary tract infection) (10/10/2018) Seizures ([address] (10/10/2018) Type II diabetes mellitus ([facility] [address] (10/11/2018) HTN (hypertension) (10/11/2018) CAD (coronary artery disease) (10/11/2018) Cerebrovascular disease (10/11/2018) Plan: 
Continue with antibiotics Follow cultures BP control BS control DVT prophylaxis.

## 2018-10-13 NOTE — ROUTINE PROCESS
1938: Assumed care. Sleeping. HOB elevated. No sob on RA. All 4 side rails padded. Bed alarm on. Call light within reach. IVF of D5W infusing well to Rt hand. 2000: Patient refused lab draw. 2039: Patient refused v/s & blood sugar check. Due IV antibiotic given. 2345: Incontinence care provided. 0041: Due meds given. 0300: Patient refused lab draw. Explanation provided. 8859: No change from previous assessment. 0630: slept good thru night. Needs attended. 0800: Bedside and Verbal shift change report given to [address] [place] [address]  (oncoming nurse) by me (offgoing nurse). Report included the following information SBAR, Kardex, Intake/Output, MAR and Recent Results.

## 2018-10-13 NOTE — ROUTINE PROCESS
Bedside and Verbal shift change report given to [address] [place] [address] Carlos (oncoming nurse) by Jenni Kelley (offgoing nurse). Report included the following information SBAR, Kardex, Intake/Output, MAR, Recent Results and Cardiac Rhythm NSR with PAC's.  
 
0800 Pt refused labs. Will try again. 1200 Pt continues to refuse lab draws and medications. Will continue to monitor. 46 Pt's son at bedside and stated that they decided to just bring pt home at time of discharge versus going to a facility. Will continue to monitor. 1745 Pt resting in bed with no complaints of pain and no change to condition. Pt continues to refuse medications and blood sugar checks. Bedside and Verbal shift change report given to Sandra VALERIO (oncoming nurse) by [address] [place] [address] Carlos (offgoing nurse). Report included the following information SBAR, Kardex, Intake/Output, MAR, Recent Results and Cardiac Rhythm NSR with PAC's.

## 2018-10-13 NOTE — PROGRESS NOTES
Problem: Falls - Risk of 
Goal: *Absence of Falls Document [facility] Check Fall Risk and appropriate interventions in the flowsheet. Outcome: Progressing Towards Goal 
Fall Risk Interventions: 
Mobility Interventions: Bed/chair exit alarm, Communicate number of staff needed for ambulation/transfer Mentation Interventions: Reorient patient, Evaluate medications/consider consulting pharmacy Medication Interventions: Teach patient to arise slowly, Patient to call before getting OOB Elimination Interventions: Call light in reach, Bed/chair exit alarm Problem: Pressure Injury - Risk of 
Goal: *Prevention of pressure injury Document Roby Scale and appropriate interventions in the flowsheet. Outcome: Progressing Towards Goal 
Pressure Injury Interventions: 
Sensory Interventions: Maintain/enhance activity level, Minimize linen layers, Keep linens dry and wrinkle-free Moisture Interventions: Minimize layers Activity Interventions: PT/OT evaluation, Pressure redistribution bed/mattress(bed type) Mobility Interventions: HOB 30 degrees or less, PT/OT evaluation, Pressure redistribution bed/mattress (bed type) Nutrition Interventions: Document food/fluid/supplement intake Friction and Shear Interventions: Minimize layers, HOB 30 degrees or less, Foam dressings/transparent film/skin sealants

## 2018-10-13 NOTE — PROGRESS NOTES
Problem: Falls - Risk of 
Goal: *Absence of Falls Document Catrachita Gomez Fall Risk and appropriate interventions in the flowsheet. Outcome: Progressing Towards Goal 
Fall Risk Interventions: 
Mobility Interventions: Bed/chair exit alarm Mentation Interventions: Adequate sleep, hydration, pain control, Bed/chair exit alarm, Door open when patient unattended, Reorient patient, Room close to nurse's station Medication Interventions: Bed/chair exit alarm, Evaluate medications/consider consulting pharmacy Elimination Interventions: Bed/chair exit alarm, Call light in reach

## 2018-10-13 NOTE — PROGRESS NOTES
Problem: Pressure Injury - Risk of 
Goal: *Prevention of pressure injury Document Roby Scale and appropriate interventions in the flowsheet. Outcome: Progressing Towards Goal 
Pressure Injury Interventions: 
Sensory Interventions: Keep linens dry and wrinkle-free, Pressure redistribution bed/mattress (bed type) Moisture Interventions: Absorbent underpads Activity Interventions: Pressure redistribution bed/mattress(bed type) Mobility Interventions: HOB 30 degrees or less, Pressure redistribution bed/mattress (bed type) Nutrition Interventions: Document food/fluid/supplement intake, Offer support with meals,snacks and hydration Friction and Shear Interventions: HOB 30 degrees or less, Minimize layers

## 2018-10-13 NOTE — ROUTINE PROCESS
1917: Assumed care. Awake. Watching TV. HOB elevated. On RA. No discomfort noted at this time. Call light within reach. 2000: Patient's daughter called. Not listed as contact. Advised to call sibling for update. 2028: Patient allowed this nurse to check her v/s. 
 
2056: Some PO meds were given. Mixed in ginger ale. 
 
2308: Due IV med given. 0015: Refused v/s check. 0200: Sleeping. 1461: Refused v/s check. 0505: Due IV med given. 0645: Slept good thru night. Needs attended. 0730: Bedside and Verbal shift change report given to Hany Daley (oncoming nurse) by me (offgoing nurse). Report included the following information SBAR, Kardex, Intake/Output, MAR and Recent Results. Incontinence care provided.

## 2018-10-14 LAB
ANION GAP SERPL CALC-SCNC: 10 MMOL/L (ref 3–18)
BACTERIA SPEC CULT: ABNORMAL
BASOPHILS # BLD: 0 K/UL (ref 0–0.1)
BASOPHILS NFR BLD: 0 % (ref 0–2)
BUN SERPL-MCNC: 4 MG/DL (ref 7–18)
BUN/CREAT SERPL: 5 (ref 12–20)
CA-I SERPL-SCNC: 0.87 MMOL/L (ref 1.12–1.32)
CALCIUM SERPL-MCNC: 6.4 MG/DL (ref 8.5–10.1)
CHLORIDE SERPL-SCNC: 106 MMOL/L (ref 100–108)
CO2 SERPL-SCNC: 27 MMOL/L (ref 21–32)
CREAT SERPL-MCNC: 0.79 MG/DL (ref 0.6–1.3)
DIFFERENTIAL METHOD BLD: ABNORMAL
EOSINOPHIL # BLD: 0 K/UL (ref 0–0.4)
EOSINOPHIL NFR BLD: 0 % (ref 0–5)
ERYTHROCYTE [DISTWIDTH] IN BLOOD BY AUTOMATED COUNT: 15.4 % (ref 11.6–14.5)
GLUCOSE BLD STRIP.AUTO-MCNC: 119 MG/DL (ref 70–110)
GLUCOSE BLD STRIP.AUTO-MCNC: 232 MG/DL (ref 70–110)
GLUCOSE SERPL-MCNC: 105 MG/DL (ref 74–99)
GRAM STN SPEC: ABNORMAL
GRAM STN SPEC: ABNORMAL
HCT VFR BLD AUTO: 29.5 % (ref 35–45)
HGB BLD-MCNC: 9.2 G/DL (ref 12–16)
LYMPHOCYTES # BLD: 3 K/UL (ref 0.9–3.6)
LYMPHOCYTES NFR BLD: 45 % (ref 21–52)
MAGNESIUM SERPL-MCNC: 1.1 MG/DL (ref 1.6–2.6)
MCH RBC QN AUTO: 25.3 PG (ref 24–34)
MCHC RBC AUTO-ENTMCNC: 31.2 G/DL (ref 31–37)
MCV RBC AUTO: 81.3 FL (ref 74–97)
MONOCYTES # BLD: 0.3 K/UL (ref 0.05–1.2)
MONOCYTES NFR BLD: 5 % (ref 3–10)
NEUTS SEG # BLD: 3.4 K/UL (ref 1.8–8)
NEUTS SEG NFR BLD: 50 % (ref 40–73)
PHOSPHATE SERPL-MCNC: 3.1 MG/DL (ref 2.5–4.9)
PLATELET # BLD AUTO: 469 K/UL (ref 135–420)
PMV BLD AUTO: 9.1 FL (ref 9.2–11.8)
POTASSIUM SERPL-SCNC: 3.6 MMOL/L (ref 3.5–5.5)
RBC # BLD AUTO: 3.63 M/UL (ref 4.2–5.3)
SERVICE CMNT-IMP: ABNORMAL
SODIUM SERPL-SCNC: 143 MMOL/L (ref 136–145)
WBC # BLD AUTO: 6.8 K/UL (ref 4.6–13.2)

## 2018-10-14 PROCEDURE — 74011250636 HC RX REV CODE- 250/636: Performed by: NURSE PRACTITIONER

## 2018-10-14 PROCEDURE — 80048 BASIC METABOLIC PNL TOTAL CA: CPT | Performed by: HOSPITALIST

## 2018-10-14 PROCEDURE — 85025 COMPLETE CBC W/AUTO DIFF WBC: CPT | Performed by: HOSPITALIST

## 2018-10-14 PROCEDURE — 74011636637 HC RX REV CODE- 636/637: Performed by: HOSPITALIST

## 2018-10-14 PROCEDURE — 84100 ASSAY OF PHOSPHORUS: CPT | Performed by: HOSPITALIST

## 2018-10-14 PROCEDURE — 83735 ASSAY OF MAGNESIUM: CPT | Performed by: HOSPITALIST

## 2018-10-14 PROCEDURE — 82962 GLUCOSE BLOOD TEST: CPT

## 2018-10-14 PROCEDURE — 74011250636 HC RX REV CODE- 250/636: Performed by: HOSPITALIST

## 2018-10-14 PROCEDURE — 74011250637 HC RX REV CODE- 250/637: Performed by: HOSPITALIST

## 2018-10-14 PROCEDURE — 74011250637 HC RX REV CODE- 250/637: Performed by: NURSE PRACTITIONER

## 2018-10-14 PROCEDURE — 82330 ASSAY OF CALCIUM: CPT | Performed by: HOSPITALIST

## 2018-10-14 PROCEDURE — 74011000258 HC RX REV CODE- 258: Performed by: NURSE PRACTITIONER

## 2018-10-14 PROCEDURE — 65270000029 HC RM PRIVATE

## 2018-10-14 PROCEDURE — 36415 COLL VENOUS BLD VENIPUNCTURE: CPT | Performed by: HOSPITALIST

## 2018-10-14 PROCEDURE — 74011000258 HC RX REV CODE- 258: Performed by: HOSPITALIST

## 2018-10-14 PROCEDURE — 74011250636 HC RX REV CODE- 250/636: Performed by: EMERGENCY MEDICINE

## 2018-10-14 RX ORDER — POTASSIUM CHLORIDE 1.5 G/1.77G
40 POWDER, FOR SOLUTION ORAL
Status: COMPLETED | OUTPATIENT
Start: 2018-10-14 | End: 2018-10-14

## 2018-10-14 RX ORDER — MAGNESIUM SULFATE HEPTAHYDRATE 40 MG/ML
2 INJECTION, SOLUTION INTRAVENOUS ONCE
Status: COMPLETED | OUTPATIENT
Start: 2018-10-14 | End: 2018-10-14

## 2018-10-14 RX ADMIN — MAGNESIUM SULFATE IN WATER 2 G: 40 INJECTION, SOLUTION INTRAVENOUS at 10:47

## 2018-10-14 RX ADMIN — GABAPENTIN 250 MG: 250 SOLUTION ORAL at 17:06

## 2018-10-14 RX ADMIN — LEVETIRACETAM 750 MG: 100 SOLUTION ORAL at 09:21

## 2018-10-14 RX ADMIN — CALCIUM GLUCONATE 2 G: 94 INJECTION, SOLUTION INTRAVENOUS at 13:39

## 2018-10-14 RX ADMIN — CLONIDINE HYDROCHLORIDE 0.1 MG: 0.1 TABLET ORAL at 09:22

## 2018-10-14 RX ADMIN — Medication 100 MG: at 09:22

## 2018-10-14 RX ADMIN — PIPERACILLIN SODIUM,TAZOBACTAM SODIUM 4.5 G: 4; .5 INJECTION, POWDER, FOR SOLUTION INTRAVENOUS at 21:36

## 2018-10-14 RX ADMIN — CALCIUM GLUCONATE 2 G: 94 INJECTION, SOLUTION INTRAVENOUS at 12:13

## 2018-10-14 RX ADMIN — SIMVASTATIN 40 MG: 40 TABLET, FILM COATED ORAL at 21:36

## 2018-10-14 RX ADMIN — INSULIN LISPRO 2 UNITS: 100 INJECTION, SOLUTION INTRAVENOUS; SUBCUTANEOUS at 21:37

## 2018-10-14 RX ADMIN — ASPIRIN 81 MG CHEWABLE TABLET 81 MG: 81 TABLET CHEWABLE at 09:22

## 2018-10-14 RX ADMIN — POTASSIUM CHLORIDE 40 MEQ: 1.5 POWDER, FOR SOLUTION ORAL at 10:46

## 2018-10-14 RX ADMIN — LISINOPRIL 20 MG: 10 TABLET ORAL at 09:21

## 2018-10-14 RX ADMIN — GABAPENTIN 250 MG: 250 SOLUTION ORAL at 21:36

## 2018-10-14 RX ADMIN — PIPERACILLIN SODIUM,TAZOBACTAM SODIUM 4.5 G: 4; .5 INJECTION, POWDER, FOR SOLUTION INTRAVENOUS at 14:16

## 2018-10-14 RX ADMIN — MIRTAZAPINE 15 MG: 15 TABLET, FILM COATED ORAL at 21:36

## 2018-10-14 RX ADMIN — PIPERACILLIN SODIUM,TAZOBACTAM SODIUM 4.5 G: 4; .5 INJECTION, POWDER, FOR SOLUTION INTRAVENOUS at 05:05

## 2018-10-14 RX ADMIN — LEVETIRACETAM 750 MG: 100 SOLUTION ORAL at 17:07

## 2018-10-14 RX ADMIN — LEVOFLOXACIN 750 MG: 5 INJECTION, SOLUTION INTRAVENOUS at 19:31

## 2018-10-14 RX ADMIN — FOLIC ACID 1 MG: 1 TABLET ORAL at 09:22

## 2018-10-14 RX ADMIN — GABAPENTIN 250 MG: 250 SOLUTION ORAL at 09:21

## 2018-10-14 NOTE — PROGRESS NOTES
Progress Note Patient: Ramo Law               Sex: female          DOA: 10/10/2018 YOB: 1950      Age:  80 y.o.        LOS:  LOS: 4 days CHIEF COMPLAINT:  Sepsis in the setting of cerebrovascular disease, improving Subjective:  
 
Awake No distress Objective:  
  
Visit Vitals  /69  Pulse 64  Temp 98.5 °F (36.9 °C)  Resp 18  Ht 5' 3\" (1.6 m)  Wt 59 kg (130 lb)  SpO2 100%  BMI 23.03 kg/m2 Physical Exam: 
Gen:  No distress, no complaint Lungs:  Clear bilaterally, no wheeze or rhonchi Heart:  Regular rate and rhythm, no murmurs or gallops Abdomen:  Soft, non-tender, normal bowel sounds Lab/Data Reviewed: 
 
Labs reviewed Assessment/Plan Principal Problem: 
  Sepsis ([address] (10/10/2018) Active Problems: 
  UTI (urinary tract infection) (10/10/2018) Seizures ([facility] [address] [address] (10/10/2018) Type II diabetes mellitus ([facility] [address] (10/11/2018) HTN (hypertension) (10/11/2018) CAD (coronary artery disease) (10/11/2018) Cerebrovascular disease (10/11/2018) Plan: 
Continue with antibiotics BP control BS control I suspect patient is returning to baseline DVT prophylaxis.

## 2018-10-14 NOTE — PROGRESS NOTES
Informed by nurse that pt's family now want to bring pt home with home health rather than SNF. Will cont to follow. Maura Guillen RN,ext [phone].

## 2018-10-14 NOTE — ROUTINE PROCESS
0730- Assumed care. Pt in bed awake. Alert and demanding. Oriented to person only. No c/o pain. No respiratory distress noted. Call light in reach. 0930- Due meds given in gingerale. Pt is non compliant and refuses lantus. Refusing labs. 1110- Pt is refusing VS and POC glucose check. Gordon Sewell CM made aware of family request to discharge pt home instead of long-term care. Bedside and Verbal shift change report given to [facility] Rakesh Baron (oncoming nurse) by Pia Hodgson RN 
 (offgoing nurse). Report included the following information SBAR, Kardex, Procedure Summary, Intake/Output, MAR, Recent Results and Med Rec Status.

## 2018-10-14 NOTE — PROGRESS NOTES
Problem: Falls - Risk of 
Goal: *Absence of Falls Document [facility] Fall Risk and appropriate interventions in the flowsheet. Outcome: Progressing Towards Goal 
Fall Risk Interventions: 
Mobility Interventions: Communicate number of staff needed for ambulation/transfer Mentation Interventions: Adequate sleep, hydration, pain control Medication Interventions: Bed/chair exit alarm Elimination Interventions: Bed/chair exit alarm Problem: Pressure Injury - Risk of 
Goal: *Prevention of pressure injury Document Roby Scale and appropriate interventions in the flowsheet. Outcome: Progressing Towards Goal 
Pressure Injury Interventions: 
Sensory Interventions: Assess changes in LOC Moisture Interventions: Absorbent underpads Activity Interventions: Pressure redistribution bed/mattress(bed type) Mobility Interventions: HOB 30 degrees or less Nutrition Interventions: Document food/fluid/supplement intake Friction and Shear Interventions: HOB 30 degrees or less Problem: Seizure Disorder (Adult) Goal: *STG/LTG: Complies with medication therapy Outcome: Not Met Pt is resistive to any medications or interventions

## 2018-10-15 VITALS
HEART RATE: 75 BPM | RESPIRATION RATE: 18 BRPM | WEIGHT: 130 LBS | OXYGEN SATURATION: 100 % | TEMPERATURE: 98.9 F | DIASTOLIC BLOOD PRESSURE: 85 MMHG | BODY MASS INDEX: 23.04 KG/M2 | HEIGHT: 63 IN | SYSTOLIC BLOOD PRESSURE: 142 MMHG

## 2018-10-15 LAB
BACTERIA SPEC CULT: ABNORMAL
BACTERIA SPEC CULT: ABNORMAL
GLUCOSE BLD STRIP.AUTO-MCNC: 152 MG/DL (ref 70–110)
SERVICE CMNT-IMP: ABNORMAL

## 2018-10-15 PROCEDURE — 82962 GLUCOSE BLOOD TEST: CPT

## 2018-10-15 PROCEDURE — 77030037878 HC DRSG MEPILEX >48IN BORD MOLN -B

## 2018-10-15 PROCEDURE — 90686 IIV4 VACC NO PRSV 0.5 ML IM: CPT | Performed by: HOSPITALIST

## 2018-10-15 PROCEDURE — 77030038269 HC DRN EXT URIN PURWCK BARD -A

## 2018-10-15 PROCEDURE — 74011250637 HC RX REV CODE- 250/637: Performed by: HOSPITALIST

## 2018-10-15 PROCEDURE — 74011636637 HC RX REV CODE- 636/637: Performed by: HOSPITALIST

## 2018-10-15 PROCEDURE — 74011250636 HC RX REV CODE- 250/636: Performed by: HOSPITALIST

## 2018-10-15 PROCEDURE — 90471 IMMUNIZATION ADMIN: CPT

## 2018-10-15 RX ORDER — LEVOFLOXACIN 500 MG/1
500 TABLET, FILM COATED ORAL DAILY
Qty: 7 TAB | Refills: 0 | Status: SHIPPED | OUTPATIENT
Start: 2018-10-15 | End: 2018-10-22

## 2018-10-15 RX ADMIN — LEVETIRACETAM 750 MG: 100 SOLUTION ORAL at 09:12

## 2018-10-15 RX ADMIN — GABAPENTIN 250 MG: 250 SOLUTION ORAL at 09:05

## 2018-10-15 RX ADMIN — INSULIN LISPRO 2 UNITS: 100 INJECTION, SOLUTION INTRAVENOUS; SUBCUTANEOUS at 09:02

## 2018-10-15 RX ADMIN — FOLIC ACID 1 MG: 1 TABLET ORAL at 09:05

## 2018-10-15 RX ADMIN — Medication 100 MG: at 09:05

## 2018-10-15 RX ADMIN — ASPIRIN 81 MG CHEWABLE TABLET 81 MG: 81 TABLET CHEWABLE at 09:05

## 2018-10-15 RX ADMIN — INSULIN GLARGINE 15 UNITS: 100 INJECTION, SOLUTION SUBCUTANEOUS at 09:02

## 2018-10-15 RX ADMIN — LISINOPRIL 20 MG: 10 TABLET ORAL at 09:05

## 2018-10-15 RX ADMIN — CLONIDINE HYDROCHLORIDE 0.1 MG: 0.1 TABLET ORAL at 10:47

## 2018-10-15 RX ADMIN — INFLUENZA VIRUS VACCINE 0.5 ML: 15; 15; 15; 15 SUSPENSION INTRAMUSCULAR at 09:03

## 2018-10-15 NOTE — MANAGEMENT PLAN
Discharge/Transition Planning 
 
0900: Son called present CM and stated he was taking pt home and not nursing home. Stated he needed transport. Passed info along to [employer]. Andrea Charlton RN BSN Outcomes Manager Pager # [phone]

## 2018-10-15 NOTE — PROGRESS NOTES
10:10a Per Julissa at [facility] - must verify address on file for patient before I can schedule transportation. Address on file with [facility] is not the address we have. Called the patients son to verify previous address on file w/[employer]. He provided addresss: 
[address] [facility] [phone]. Pts son Lizet Benz - stated he would pick pt up at [address] today. Spoke to Leopold at the nursing station to make her aware of the time pts son would arrive. Diane Chi Care Management Specialist / [facility]

## 2018-10-15 NOTE — PROGRESS NOTES
Bedside shift report received from [place]: AOX1, on room air, with regular, nonlabored breathing; generally weak, with limited movements of right arm and leg; pad dry; IVF infusing well; NSR on tele; shift assessment completed 2137: due meds given as scheduled; = 7 units Humalog given sc for coverage 2300: sleeping; IVF infusing well 
 
0200: sleeping; no apparent distress noted 8593: incontinent care done; bath given; gown, pad and linen changed 0600: quietly sleeping 
 
0700: sleeping; no apparent distress noted Bedside and Verbal shift change report given to Karie Soares RN (oncoming nurse) by Bernard Arce RN (offgoing nurse). Report included the following information SBAR, Kardex, Intake/Output, Recent Results and Cardiac Rhythm NSR.

## 2018-10-15 NOTE — PROGRESS NOTES
Problem: Falls - Risk of 
Goal: *Absence of Falls Document Janet Mannie Fall Risk and appropriate interventions in the flowsheet. Outcome: Progressing Towards Goal 
Fall Risk Interventions: 
Mobility Interventions: Bed/chair exit alarm, Strengthening exercises (ROM-active/passive) Mentation Interventions: Bed/chair exit alarm, Toileting rounds Medication Interventions: Evaluate medications/consider consulting pharmacy, Bed/chair exit alarm Elimination Interventions: Bed/chair exit alarm, Toileting schedule/hourly rounds Problem: Pressure Injury - Risk of 
Goal: *Prevention of pressure injury Document Roby Scale and appropriate interventions in the flowsheet. Outcome: Progressing Towards Goal 
Pressure Injury Interventions: 
Sensory Interventions: Assess changes in LOC, Check visual cues for pain, Keep linens dry and wrinkle-free, Pressure redistribution bed/mattress (bed type) Moisture Interventions: Absorbent underpads Activity Interventions: Pressure redistribution bed/mattress(bed type) Mobility Interventions: HOB 30 degrees or less Nutrition Interventions: Document food/fluid/supplement intake Friction and Shear Interventions: HOB 30 degrees or less

## 2018-10-15 NOTE — PROGRESS NOTES
Care Management Interventions PCP Verified by CM: Yes (Pt's son Isaac Porter states that his mother does not see a DrEren, but that an NP visits her at home kimberly 2-3 mos to check vitals, skin condition, etc.  States NP is Zac Aguiar ([address] [id number] [phone])) Palliative Care Criteria Met (RRAT>21 & CHF Dx)?: No 
Mode of Transport at Discharge: Other (see comment) (family) Hospital Transport Time of Discharge: 1200 Transition of Care Consult (CM Consult): Other (home) MyChart Signup: No 
Discharge Durable Medical Equipment: No 
Health Maintenance Reviewed: Yes Physical Therapy Consult: No 
Occupational Therapy Consult: No 
Speech Therapy Consult: No 
Current Support Network: Relative's Home Confirm Follow Up Transport: Other (see comment) (family) Plan discussed with Pt/Family/Caregiver: Yes [employer] Information Provided?: No 
Discharge Location Discharge Placement: Home FOLLOW UP VISIT Appointment in: One Week Follow up with PCP in 1 week, patient to arrange. (Order #[id number]) on 10/15/18

## 2018-10-15 NOTE — PROGRESS NOTES
conducted a Follow up consultation and Spiritual Assessment for Omid Honeycutt, who is a 80 y.o.,female. The  provided the following Interventions: 
Continued the relationship of care and support. Listened empathically. Offered prayer and assurance of continued prayer on patients behalf. Chart reviewed. The following outcomes were achieved: 
Patient expressed gratitude for pastoral care visit. Assessment: 
There are no further spiritual or Oriental orthodox issues which require Spiritual Care Services interventions at this time. Plan: 
Chaplains will continue to follow and will provide pastoral care on an as needed/requested basis.  recommends bedside caregivers page  on duty if patient shows signs of acute spiritual or emotional distress. [facility] Staff  Spiritual Care  
[phone]

## 2018-10-16 LAB
BACTERIA SPEC CULT: NORMAL
SERVICE CMNT-IMP: NORMAL

## 2018-10-16 NOTE — DISCHARGE SUMMARY
[facility]    DISCHARGE SUMMARY    Chao Bernstein  MR#: [id number]  : 1950  ACCOUNT #: [de-identified]   ADMIT DATE: 10/10/2018  DISCHARGE DATE: 10/15/2018    ADMITTING DIAGNOSIS:  Sepsis of urinary origin. HISTORY OF PRESENT ILLNESS:  The patient is a 72-year-old -American female with a known history of cerebrovascular disease and previous seizures. She presented to the emergency department on 10/10/2018 after having had a seizure at home. In the emergency room, she was found to have sepsis from UTI and she was admitted for ongoing management. HOSPITAL COURSE:  The patient was begun on IV antibiotics. We have followed her blood culture with ID and sensitivity. She has E. coli growing out of her blood and urine, which is virtually pansensitive. It is susceptible to Levaquin. She is improved to her baseline. She is interactive and is happy with the prospect of leaving the hospital later today. She will be discharged home. PRIMARY DIAGNOSES:  1. Sepsis of urinary origin, improved in need of ongoing antibiotics. 2.  Seizures, improved. 3.  Cerebrovascular disease at baseline. 4.  Diabetes. 5.  Hypertension. 6.  Coronary artery disease. CONDITION:  Her condition is improved. ACTIVITIES:  Ad ashlee. FOLLOWUP:  With her primary care provider in 1 week. Patient will arrange. DISCHARGE MEDICATIONS:  1. Aspirin 81 mg by mouth daily. 2.  Calcium plus D 600/125 one tablet by mouth 3 times daily. 3.  Clonidine 0.1 mg by mouth 2 times daily. 4.  Lotrimin 1% cream applied to affected area 2 times daily. 5.  Vasotec 2.5 mg by mouth daily. 6.  Eucerin cream, apply to affected area as needed. 7.  Folic acid 1 mg by mouth daily. 8.  Neurontin 250 mg by mouth 3 times daily. 9.  Glipizide 5 mg by mouth 2 times daily. 10.  Levemir 27 units subcutaneously nightly. 11.  Lantus 25 units subcutaneously nightly. 12.  Keppra 750 mg by mouth 2 times daily.   13. Levaquin 500 mg by mouth daily for 7 days. 14.  Prinivil 10 mg by mouth daily. 15.  Imodium 2 mg by mouth 4 times daily as needed. 16.  Ativan 0.5 mg by mouth every 6 hours as needed. 17.  Metformin 1000 mg by mouth 2 times daily. 18.  Remeron 15 mg by mouth nightly. 19.  Zocor 40 mg by mouth nightly. 20.  Thiamine 100 mg by mouth daily. DIET:  Her diet is diabetic. MD CAMRYN Guerrier/[place]  D: 10/15/2018 06:06     T: 10/15/2018 20:21  JOB #: [id number]  CC: Vicky Hou MD

## 2021-08-03 PROBLEM — E11.9 TYPE II DIABETES MELLITUS (HCC): Status: RESOLVED | Noted: 2018-10-11 | Resolved: 2021-08-03

## 2022-08-31 NOTE — ED NOTES
Pt to CT 
 Otezla Pregnancy And Lactation Text: This medication is Pregnancy Category C and it isn't known if it is safe during pregnancy. It is unknown if it is excreted in breast milk.